# Patient Record
Sex: MALE | Race: WHITE | NOT HISPANIC OR LATINO | Employment: OTHER | URBAN - METROPOLITAN AREA
[De-identification: names, ages, dates, MRNs, and addresses within clinical notes are randomized per-mention and may not be internally consistent; named-entity substitution may affect disease eponyms.]

---

## 2020-05-24 ENCOUNTER — HOSPITAL ENCOUNTER (INPATIENT)
Facility: HOSPITAL | Age: 70
LOS: 4 days | DRG: 493 | End: 2020-05-28
Attending: INTERNAL MEDICINE | Admitting: INTERNAL MEDICINE
Payer: COMMERCIAL

## 2020-05-24 ENCOUNTER — APPOINTMENT (EMERGENCY)
Dept: RADIOLOGY | Facility: HOSPITAL | Age: 70
DRG: 493 | End: 2020-05-24
Payer: COMMERCIAL

## 2020-05-24 ENCOUNTER — APPOINTMENT (EMERGENCY)
Dept: CT IMAGING | Facility: HOSPITAL | Age: 70
DRG: 493 | End: 2020-05-24
Payer: COMMERCIAL

## 2020-05-24 DIAGNOSIS — S82.842A BIMALLEOLAR ANKLE FRACTURE, LEFT, CLOSED, INITIAL ENCOUNTER: Primary | ICD-10-CM

## 2020-05-24 PROBLEM — I87.2 VENOUS INSUFFICIENCY: Status: ACTIVE | Noted: 2017-03-03

## 2020-05-24 PROBLEM — H26.9 CATARACT: Status: ACTIVE | Noted: 2020-05-24

## 2020-05-24 PROBLEM — K21.9 ESOPHAGEAL REFLUX: Status: ACTIVE | Noted: 2020-05-24

## 2020-05-24 LAB
ABO GROUP BLD: NORMAL
ABO GROUP BLD: NORMAL
ANION GAP SERPL CALCULATED.3IONS-SCNC: 10 MMOL/L (ref 4–13)
APTT PPP: 29 SECONDS (ref 23–37)
BASOPHILS # BLD AUTO: 0 THOUSANDS/ΜL (ref 0–0.1)
BASOPHILS NFR BLD AUTO: 0 % (ref 0–2)
BILIRUB UR QL STRIP: NEGATIVE
BLD GP AB SCN SERPL QL: NEGATIVE
BUN SERPL-MCNC: 20 MG/DL (ref 7–25)
CALCIUM SERPL-MCNC: 8.9 MG/DL (ref 8.6–10.5)
CHLORIDE SERPL-SCNC: 107 MMOL/L (ref 98–107)
CLARITY UR: ABNORMAL
CO2 SERPL-SCNC: 21 MMOL/L (ref 21–31)
COLOR UR: YELLOW
CREAT SERPL-MCNC: 1.11 MG/DL (ref 0.7–1.3)
EOSINOPHIL # BLD AUTO: 0.2 THOUSAND/ΜL (ref 0–0.61)
EOSINOPHIL NFR BLD AUTO: 1 % (ref 0–5)
ERYTHROCYTE [DISTWIDTH] IN BLOOD BY AUTOMATED COUNT: 15.3 % (ref 11.5–14.5)
GFR SERPL CREATININE-BSD FRML MDRD: 66 ML/MIN/1.73SQ M
GLUCOSE SERPL-MCNC: 97 MG/DL (ref 65–99)
GLUCOSE UR STRIP-MCNC: NEGATIVE MG/DL
HCT VFR BLD AUTO: 44.4 % (ref 42–47)
HGB BLD-MCNC: 14.9 G/DL (ref 14–18)
HGB UR QL STRIP.AUTO: NEGATIVE
INR PPP: 1.59 (ref 0.84–1.19)
KETONES UR STRIP-MCNC: ABNORMAL MG/DL
LEUKOCYTE ESTERASE UR QL STRIP: NEGATIVE
LYMPHOCYTES # BLD AUTO: 1.6 THOUSANDS/ΜL (ref 0.6–4.47)
LYMPHOCYTES NFR BLD AUTO: 12 % (ref 21–51)
MCH RBC QN AUTO: 33.7 PG (ref 26–34)
MCHC RBC AUTO-ENTMCNC: 33.7 G/DL (ref 31–37)
MCV RBC AUTO: 100 FL (ref 81–99)
MONOCYTES # BLD AUTO: 1 THOUSAND/ΜL (ref 0.17–1.22)
MONOCYTES NFR BLD AUTO: 7 % (ref 2–12)
NEUTROPHILS # BLD AUTO: 10.8 THOUSANDS/ΜL (ref 1.4–6.5)
NEUTS SEG NFR BLD AUTO: 80 % (ref 42–75)
NITRITE UR QL STRIP: NEGATIVE
PH UR STRIP.AUTO: 6 [PH]
PLATELET # BLD AUTO: 445 THOUSANDS/UL (ref 149–390)
PMV BLD AUTO: 7.9 FL (ref 8.6–11.7)
POTASSIUM SERPL-SCNC: 4 MMOL/L (ref 3.5–5.5)
PROT UR STRIP-MCNC: NEGATIVE MG/DL
PROTHROMBIN TIME: 18.4 SECONDS (ref 11.6–14.5)
RBC # BLD AUTO: 4.43 MILLION/UL (ref 4.3–5.9)
RH BLD: POSITIVE
RH BLD: POSITIVE
SARS-COV-2 RNA RESP QL NAA+PROBE: NEGATIVE
SODIUM SERPL-SCNC: 138 MMOL/L (ref 134–143)
SP GR UR STRIP.AUTO: >=1.03 (ref 1–1.03)
SPECIMEN EXPIRATION DATE: NORMAL
TROPONIN I SERPL-MCNC: <0.03 NG/ML
UROBILINOGEN UR QL STRIP.AUTO: 0.2 E.U./DL
WBC # BLD AUTO: 13.6 THOUSAND/UL (ref 4.8–10.8)

## 2020-05-24 PROCEDURE — 99285 EMERGENCY DEPT VISIT HI MDM: CPT | Performed by: PHYSICIAN ASSISTANT

## 2020-05-24 PROCEDURE — 73600 X-RAY EXAM OF ANKLE: CPT

## 2020-05-24 PROCEDURE — 80048 BASIC METABOLIC PNL TOTAL CA: CPT | Performed by: PHYSICIAN ASSISTANT

## 2020-05-24 PROCEDURE — 85730 THROMBOPLASTIN TIME PARTIAL: CPT | Performed by: PHYSICIAN ASSISTANT

## 2020-05-24 PROCEDURE — 81003 URINALYSIS AUTO W/O SCOPE: CPT | Performed by: PHYSICIAN ASSISTANT

## 2020-05-24 PROCEDURE — 85025 COMPLETE CBC W/AUTO DIFF WBC: CPT | Performed by: PHYSICIAN ASSISTANT

## 2020-05-24 PROCEDURE — 36415 COLL VENOUS BLD VENIPUNCTURE: CPT | Performed by: PHYSICIAN ASSISTANT

## 2020-05-24 PROCEDURE — 27840 TREAT ANKLE DISLOCATION: CPT | Performed by: PHYSICIAN ASSISTANT

## 2020-05-24 PROCEDURE — 96375 TX/PRO/DX INJ NEW DRUG ADDON: CPT

## 2020-05-24 PROCEDURE — 86900 BLOOD TYPING SEROLOGIC ABO: CPT | Performed by: PHYSICIAN ASSISTANT

## 2020-05-24 PROCEDURE — 99285 EMERGENCY DEPT VISIT HI MDM: CPT

## 2020-05-24 PROCEDURE — 99223 1ST HOSP IP/OBS HIGH 75: CPT | Performed by: PHYSICIAN ASSISTANT

## 2020-05-24 PROCEDURE — 93005 ELECTROCARDIOGRAM TRACING: CPT

## 2020-05-24 PROCEDURE — 73590 X-RAY EXAM OF LOWER LEG: CPT

## 2020-05-24 PROCEDURE — 71045 X-RAY EXAM CHEST 1 VIEW: CPT

## 2020-05-24 PROCEDURE — 86901 BLOOD TYPING SEROLOGIC RH(D): CPT | Performed by: PHYSICIAN ASSISTANT

## 2020-05-24 PROCEDURE — 84484 ASSAY OF TROPONIN QUANT: CPT | Performed by: PHYSICIAN ASSISTANT

## 2020-05-24 PROCEDURE — 86850 RBC ANTIBODY SCREEN: CPT | Performed by: PHYSICIAN ASSISTANT

## 2020-05-24 PROCEDURE — 72125 CT NECK SPINE W/O DYE: CPT

## 2020-05-24 PROCEDURE — 2W3MX1Z IMMOBILIZATION OF LEFT LOWER EXTREMITY USING SPLINT: ICD-10-PCS | Performed by: EMERGENCY MEDICINE

## 2020-05-24 PROCEDURE — 73620 X-RAY EXAM OF FOOT: CPT

## 2020-05-24 PROCEDURE — 70450 CT HEAD/BRAIN W/O DYE: CPT

## 2020-05-24 PROCEDURE — 29505 APPLICATION LONG LEG SPLINT: CPT | Performed by: PHYSICIAN ASSISTANT

## 2020-05-24 PROCEDURE — 85610 PROTHROMBIN TIME: CPT | Performed by: PHYSICIAN ASSISTANT

## 2020-05-24 PROCEDURE — 73610 X-RAY EXAM OF ANKLE: CPT

## 2020-05-24 PROCEDURE — 99152 MOD SED SAME PHYS/QHP 5/>YRS: CPT | Performed by: PHYSICIAN ASSISTANT

## 2020-05-24 PROCEDURE — 96374 THER/PROPH/DIAG INJ IV PUSH: CPT

## 2020-05-24 PROCEDURE — 87635 SARS-COV-2 COVID-19 AMP PRB: CPT | Performed by: PHYSICIAN ASSISTANT

## 2020-05-24 RX ORDER — ACETAMINOPHEN 325 MG/1
650 TABLET ORAL EVERY 6 HOURS PRN
Status: DISCONTINUED | OUTPATIENT
Start: 2020-05-24 | End: 2020-05-26

## 2020-05-24 RX ORDER — DILTIAZEM HYDROCHLORIDE 300 MG/1
300 CAPSULE, COATED, EXTENDED RELEASE ORAL DAILY
Status: DISCONTINUED | OUTPATIENT
Start: 2020-05-25 | End: 2020-05-25

## 2020-05-24 RX ORDER — ETOMIDATE 2 MG/ML
20 INJECTION INTRAVENOUS ONCE
Status: COMPLETED | OUTPATIENT
Start: 2020-05-24 | End: 2020-05-24

## 2020-05-24 RX ORDER — HYDROCODONE BITARTRATE AND ACETAMINOPHEN 5; 325 MG/1; MG/1
1 TABLET ORAL EVERY 6 HOURS PRN
Status: DISCONTINUED | OUTPATIENT
Start: 2020-05-24 | End: 2020-05-25

## 2020-05-24 RX ORDER — ONDANSETRON 2 MG/ML
4 INJECTION INTRAMUSCULAR; INTRAVENOUS EVERY 6 HOURS PRN
Status: DISCONTINUED | OUTPATIENT
Start: 2020-05-24 | End: 2020-05-27

## 2020-05-24 RX ORDER — SPIRONOLACTONE 25 MG/1
25 TABLET ORAL DAILY
Status: DISCONTINUED | OUTPATIENT
Start: 2020-05-25 | End: 2020-05-25

## 2020-05-24 RX ORDER — SODIUM CHLORIDE 9 MG/ML
75 INJECTION, SOLUTION INTRAVENOUS CONTINUOUS
Status: DISCONTINUED | OUTPATIENT
Start: 2020-05-25 | End: 2020-05-25

## 2020-05-24 RX ORDER — DILTIAZEM HYDROCHLORIDE 300 MG/1
300 CAPSULE, EXTENDED RELEASE ORAL DAILY
COMMUNITY

## 2020-05-24 RX ORDER — FENTANYL CITRATE 50 UG/ML
1 INJECTION, SOLUTION INTRAMUSCULAR; INTRAVENOUS ONCE
Status: COMPLETED | OUTPATIENT
Start: 2020-05-24 | End: 2020-05-24

## 2020-05-24 RX ORDER — FENTANYL CITRATE 50 UG/ML
25 INJECTION, SOLUTION INTRAMUSCULAR; INTRAVENOUS ONCE
Status: COMPLETED | OUTPATIENT
Start: 2020-05-24 | End: 2020-05-24

## 2020-05-24 RX ORDER — HYDROMORPHONE HCL 110MG/55ML
0.5 PATIENT CONTROLLED ANALGESIA SYRINGE INTRAVENOUS ONCE
Status: COMPLETED | OUTPATIENT
Start: 2020-05-24 | End: 2020-05-24

## 2020-05-24 RX ADMIN — FENTANYL CITRATE 25 MCG: 50 INJECTION INTRAMUSCULAR; INTRAVENOUS at 20:20

## 2020-05-24 RX ADMIN — HYDROMORPHONE HYDROCHLORIDE 0.5 MG: 2 INJECTION, SOLUTION INTRAMUSCULAR; INTRAVENOUS; SUBCUTANEOUS at 20:04

## 2020-05-24 RX ADMIN — HYDROCODONE BITARTRATE AND ACETAMINOPHEN 1 TABLET: 5; 325 TABLET ORAL at 22:51

## 2020-05-24 RX ADMIN — ETOMIDATE 10 MG: 40 INJECTION, SOLUTION INTRAVENOUS at 20:10

## 2020-05-24 RX ADMIN — SODIUM CHLORIDE 75 ML/HR: 0.9 INJECTION, SOLUTION INTRAVENOUS at 23:49

## 2020-05-25 ENCOUNTER — ANESTHESIA EVENT (INPATIENT)
Dept: PERIOP | Facility: HOSPITAL | Age: 70
DRG: 493 | End: 2020-05-25
Payer: COMMERCIAL

## 2020-05-25 PROBLEM — Z01.818 PREOP EXAM FOR INTERNAL MEDICINE: Status: ACTIVE | Noted: 2020-05-25

## 2020-05-25 LAB
ANION GAP SERPL CALCULATED.3IONS-SCNC: 7 MMOL/L (ref 4–13)
ATRIAL RATE: 250 BPM
BUN SERPL-MCNC: 15 MG/DL (ref 7–25)
CALCIUM SERPL-MCNC: 8.2 MG/DL (ref 8.6–10.5)
CHLORIDE SERPL-SCNC: 109 MMOL/L (ref 98–107)
CO2 SERPL-SCNC: 23 MMOL/L (ref 21–31)
CREAT SERPL-MCNC: 0.75 MG/DL (ref 0.7–1.3)
ERYTHROCYTE [DISTWIDTH] IN BLOOD BY AUTOMATED COUNT: 14.9 % (ref 11.5–14.5)
GFR SERPL CREATININE-BSD FRML MDRD: 92 ML/MIN/1.73SQ M
GLUCOSE SERPL-MCNC: 86 MG/DL (ref 65–99)
HCT VFR BLD AUTO: 43.7 % (ref 42–47)
HGB BLD-MCNC: 15 G/DL (ref 14–18)
INR PPP: 1.46 (ref 0.84–1.19)
MCH RBC QN AUTO: 34.1 PG (ref 26–34)
MCHC RBC AUTO-ENTMCNC: 34.2 G/DL (ref 31–37)
MCV RBC AUTO: 100 FL (ref 81–99)
PLATELET # BLD AUTO: 451 THOUSANDS/UL (ref 149–390)
PMV BLD AUTO: 8.1 FL (ref 8.6–11.7)
POTASSIUM SERPL-SCNC: 3.4 MMOL/L (ref 3.5–5.5)
PROTHROMBIN TIME: 17.1 SECONDS (ref 11.6–14.5)
QRS AXIS: -35 DEGREES
QRSD INTERVAL: 94 MS
QT INTERVAL: 378 MS
QTC INTERVAL: 401 MS
RBC # BLD AUTO: 4.38 MILLION/UL (ref 4.3–5.9)
SODIUM SERPL-SCNC: 139 MMOL/L (ref 134–143)
T WAVE AXIS: -21 DEGREES
VENTRICULAR RATE: 68 BPM
WBC # BLD AUTO: 13.3 THOUSAND/UL (ref 4.8–10.8)

## 2020-05-25 PROCEDURE — 80048 BASIC METABOLIC PNL TOTAL CA: CPT | Performed by: PHYSICIAN ASSISTANT

## 2020-05-25 PROCEDURE — 85610 PROTHROMBIN TIME: CPT | Performed by: PHYSICIAN ASSISTANT

## 2020-05-25 PROCEDURE — 85027 COMPLETE CBC AUTOMATED: CPT | Performed by: PHYSICIAN ASSISTANT

## 2020-05-25 PROCEDURE — 93010 ELECTROCARDIOGRAM REPORT: CPT | Performed by: INTERNAL MEDICINE

## 2020-05-25 PROCEDURE — 99232 SBSQ HOSP IP/OBS MODERATE 35: CPT | Performed by: HOSPITALIST

## 2020-05-25 PROCEDURE — 99223 1ST HOSP IP/OBS HIGH 75: CPT | Performed by: ORTHOPAEDIC SURGERY

## 2020-05-25 RX ORDER — SODIUM CHLORIDE 9 MG/ML
75 INJECTION, SOLUTION INTRAVENOUS CONTINUOUS
Status: DISCONTINUED | OUTPATIENT
Start: 2020-05-26 | End: 2020-05-27

## 2020-05-25 RX ORDER — POTASSIUM CHLORIDE 20 MEQ/1
40 TABLET, EXTENDED RELEASE ORAL 2 TIMES DAILY
Status: COMPLETED | OUTPATIENT
Start: 2020-05-25 | End: 2020-05-26

## 2020-05-25 RX ORDER — HYDROCODONE BITARTRATE AND ACETAMINOPHEN 5; 325 MG/1; MG/1
1 TABLET ORAL EVERY 6 HOURS PRN
Status: DISCONTINUED | OUTPATIENT
Start: 2020-05-25 | End: 2020-05-26

## 2020-05-25 RX ORDER — DILTIAZEM HYDROCHLORIDE 120 MG/1
120 CAPSULE, COATED, EXTENDED RELEASE ORAL DAILY
Status: DISCONTINUED | OUTPATIENT
Start: 2020-05-25 | End: 2020-05-28 | Stop reason: HOSPADM

## 2020-05-25 RX ORDER — IBUPROFEN 600 MG/1
600 TABLET ORAL EVERY 6 HOURS PRN
Status: DISCONTINUED | OUTPATIENT
Start: 2020-05-25 | End: 2020-05-25

## 2020-05-25 RX ORDER — SPIRONOLACTONE 25 MG/1
25 TABLET ORAL DAILY
Status: DISCONTINUED | OUTPATIENT
Start: 2020-05-27 | End: 2020-05-28 | Stop reason: HOSPADM

## 2020-05-25 RX ORDER — HYDRALAZINE HYDROCHLORIDE 20 MG/ML
20 INJECTION INTRAMUSCULAR; INTRAVENOUS EVERY 6 HOURS PRN
Status: DISCONTINUED | OUTPATIENT
Start: 2020-05-25 | End: 2020-05-28 | Stop reason: HOSPADM

## 2020-05-25 RX ORDER — DILTIAZEM HYDROCHLORIDE 180 MG/1
180 CAPSULE, COATED, EXTENDED RELEASE ORAL DAILY
Status: DISCONTINUED | OUTPATIENT
Start: 2020-05-25 | End: 2020-05-28 | Stop reason: HOSPADM

## 2020-05-25 RX ADMIN — SPIRONOLACTONE 25 MG: 25 TABLET ORAL at 09:47

## 2020-05-25 RX ADMIN — DILTIAZEM HYDROCHLORIDE 120 MG: 120 CAPSULE, COATED, EXTENDED RELEASE ORAL at 09:48

## 2020-05-25 RX ADMIN — IBUPROFEN 600 MG: 600 TABLET, FILM COATED ORAL at 01:23

## 2020-05-25 RX ADMIN — POTASSIUM CHLORIDE 40 MEQ: 1500 TABLET, EXTENDED RELEASE ORAL at 18:07

## 2020-05-25 RX ADMIN — POTASSIUM CHLORIDE 40 MEQ: 1500 TABLET, EXTENDED RELEASE ORAL at 14:26

## 2020-05-25 RX ADMIN — HYDROCODONE BITARTRATE AND ACETAMINOPHEN 1 TABLET: 5; 325 TABLET ORAL at 20:37

## 2020-05-25 RX ADMIN — DILTIAZEM HYDROCHLORIDE 180 MG: 180 CAPSULE, COATED, EXTENDED RELEASE ORAL at 09:47

## 2020-05-26 ENCOUNTER — ANESTHESIA (INPATIENT)
Dept: PERIOP | Facility: HOSPITAL | Age: 70
DRG: 493 | End: 2020-05-26
Payer: COMMERCIAL

## 2020-05-26 ENCOUNTER — APPOINTMENT (INPATIENT)
Dept: RADIOLOGY | Facility: HOSPITAL | Age: 70
DRG: 493 | End: 2020-05-26
Payer: COMMERCIAL

## 2020-05-26 LAB
ANION GAP SERPL CALCULATED.3IONS-SCNC: 6 MMOL/L (ref 4–13)
BASOPHILS # BLD AUTO: 0 THOUSANDS/ΜL (ref 0–0.1)
BASOPHILS NFR BLD AUTO: 0 % (ref 0–2)
BUN SERPL-MCNC: 11 MG/DL (ref 7–25)
CALCIUM SERPL-MCNC: 8.3 MG/DL (ref 8.6–10.5)
CHLORIDE SERPL-SCNC: 107 MMOL/L (ref 98–107)
CO2 SERPL-SCNC: 24 MMOL/L (ref 21–31)
CREAT SERPL-MCNC: 0.91 MG/DL (ref 0.7–1.3)
EOSINOPHIL # BLD AUTO: 0.3 THOUSAND/ΜL (ref 0–0.61)
EOSINOPHIL NFR BLD AUTO: 2 % (ref 0–5)
ERYTHROCYTE [DISTWIDTH] IN BLOOD BY AUTOMATED COUNT: 15.1 % (ref 11.5–14.5)
GFR SERPL CREATININE-BSD FRML MDRD: 84 ML/MIN/1.73SQ M
GLUCOSE SERPL-MCNC: 103 MG/DL (ref 65–99)
HCT VFR BLD AUTO: 46.3 % (ref 42–47)
HGB BLD-MCNC: 15.8 G/DL (ref 14–18)
LYMPHOCYTES # BLD AUTO: 1.6 THOUSANDS/ΜL (ref 0.6–4.47)
LYMPHOCYTES NFR BLD AUTO: 13 % (ref 21–51)
MCH RBC QN AUTO: 34.1 PG (ref 26–34)
MCHC RBC AUTO-ENTMCNC: 34.1 G/DL (ref 31–37)
MCV RBC AUTO: 100 FL (ref 81–99)
MONOCYTES # BLD AUTO: 1.3 THOUSAND/ΜL (ref 0.17–1.22)
MONOCYTES NFR BLD AUTO: 10 % (ref 2–12)
NEUTROPHILS # BLD AUTO: 9.7 THOUSANDS/ΜL (ref 1.4–6.5)
NEUTS SEG NFR BLD AUTO: 75 % (ref 42–75)
PLATELET # BLD AUTO: 444 THOUSANDS/UL (ref 149–390)
PMV BLD AUTO: 7.9 FL (ref 8.6–11.7)
POTASSIUM SERPL-SCNC: 4.4 MMOL/L (ref 3.5–5.5)
PROCALCITONIN SERPL-MCNC: <0.05 NG/ML
RBC # BLD AUTO: 4.63 MILLION/UL (ref 4.3–5.9)
SODIUM SERPL-SCNC: 137 MMOL/L (ref 134–143)
WBC # BLD AUTO: 12.9 THOUSAND/UL (ref 4.8–10.8)

## 2020-05-26 PROCEDURE — C1713 ANCHOR/SCREW BN/BN,TIS/BN: HCPCS | Performed by: ORTHOPAEDIC SURGERY

## 2020-05-26 PROCEDURE — 27829 TREAT LOWER LEG JOINT: CPT | Performed by: ORTHOPAEDIC SURGERY

## 2020-05-26 PROCEDURE — 85025 COMPLETE CBC W/AUTO DIFF WBC: CPT | Performed by: HOSPITALIST

## 2020-05-26 PROCEDURE — 84145 PROCALCITONIN (PCT): CPT | Performed by: HOSPITALIST

## 2020-05-26 PROCEDURE — C1769 GUIDE WIRE: HCPCS | Performed by: ORTHOPAEDIC SURGERY

## 2020-05-26 PROCEDURE — 80048 BASIC METABOLIC PNL TOTAL CA: CPT | Performed by: HOSPITALIST

## 2020-05-26 PROCEDURE — 99232 SBSQ HOSP IP/OBS MODERATE 35: CPT | Performed by: HOSPITALIST

## 2020-05-26 PROCEDURE — 27814 TREATMENT OF ANKLE FRACTURE: CPT | Performed by: ORTHOPAEDIC SURGERY

## 2020-05-26 PROCEDURE — C9290 INJ, BUPIVACAINE LIPOSOME: HCPCS | Performed by: ANESTHESIOLOGY

## 2020-05-26 PROCEDURE — 73610 X-RAY EXAM OF ANKLE: CPT | Performed by: ORTHOPAEDIC SURGERY

## 2020-05-26 PROCEDURE — 3E0T3BZ INTRODUCTION OF ANESTHETIC AGENT INTO PERIPHERAL NERVES AND PLEXI, PERCUTANEOUS APPROACH: ICD-10-PCS | Performed by: ANESTHESIOLOGY

## 2020-05-26 PROCEDURE — 0QSK04Z REPOSITION LEFT FIBULA WITH INTERNAL FIXATION DEVICE, OPEN APPROACH: ICD-10-PCS | Performed by: ORTHOPAEDIC SURGERY

## 2020-05-26 PROCEDURE — 73610 X-RAY EXAM OF ANKLE: CPT

## 2020-05-26 PROCEDURE — 0QSH04Z REPOSITION LEFT TIBIA WITH INTERNAL FIXATION DEVICE, OPEN APPROACH: ICD-10-PCS | Performed by: ORTHOPAEDIC SURGERY

## 2020-05-26 DEVICE — 4.0MM CANNULATED SCREW-SHORT THREAD 54MM: Type: IMPLANTABLE DEVICE | Site: ANKLE | Status: FUNCTIONAL

## 2020-05-26 DEVICE — WASHER 7.0MM: Type: IMPLANTABLE DEVICE | Site: ANKLE | Status: FUNCTIONAL

## 2020-05-26 DEVICE — 3.5MM CORTEX SCREW SELF-TAPPING 16MM: Type: IMPLANTABLE DEVICE | Site: ANKLE | Status: FUNCTIONAL

## 2020-05-26 DEVICE — 3.5MM CORTEX SCREW SELF-TAPPING 14MM: Type: IMPLANTABLE DEVICE | Site: ANKLE | Status: FUNCTIONAL

## 2020-05-26 DEVICE — 2.7MM LOCKING SCREW SLF-TPNG WITH T8 STARDRIVE RECESS 14MM: Type: IMPLANTABLE DEVICE | Site: ANKLE | Status: FUNCTIONAL

## 2020-05-26 DEVICE — 2.7MM LOCKING SCREW SLF-TPNG WITH T8 STARDRIVE RECESS 18MM: Type: IMPLANTABLE DEVICE | Site: ANKLE | Status: FUNCTIONAL

## 2020-05-26 DEVICE — 2.7MM/3.5MM LCP LATERAL DISTAL FIBULA PLATE 4H/LEFT/86MM
Type: IMPLANTABLE DEVICE | Site: ANKLE | Status: FUNCTIONAL
Brand: LCP

## 2020-05-26 DEVICE — 2.7MM LOCKING SCREW SLF-TPNG WITH T8 STARDRIVE RECESS 16MM: Type: IMPLANTABLE DEVICE | Site: ANKLE | Status: FUNCTIONAL

## 2020-05-26 DEVICE — 3.5MM CORTEX SCREW SELF-TAPPING 50MM: Type: IMPLANTABLE DEVICE | Site: ANKLE | Status: FUNCTIONAL

## 2020-05-26 RX ORDER — METOCLOPRAMIDE HYDROCHLORIDE 5 MG/ML
10 INJECTION INTRAMUSCULAR; INTRAVENOUS ONCE AS NEEDED
Status: DISCONTINUED | OUTPATIENT
Start: 2020-05-26 | End: 2020-05-26 | Stop reason: HOSPADM

## 2020-05-26 RX ORDER — PANTOPRAZOLE SODIUM 40 MG/1
40 TABLET, DELAYED RELEASE ORAL DAILY
Status: DISCONTINUED | OUTPATIENT
Start: 2020-05-26 | End: 2020-05-28 | Stop reason: HOSPADM

## 2020-05-26 RX ORDER — OXYCODONE HYDROCHLORIDE 10 MG/1
10 TABLET ORAL EVERY 4 HOURS PRN
Status: DISCONTINUED | OUTPATIENT
Start: 2020-05-26 | End: 2020-05-28 | Stop reason: HOSPADM

## 2020-05-26 RX ORDER — CEFAZOLIN SODIUM 2 G/50ML
2000 SOLUTION INTRAVENOUS ONCE
Status: COMPLETED | OUTPATIENT
Start: 2020-05-26 | End: 2020-05-26

## 2020-05-26 RX ORDER — BUPIVACAINE HYDROCHLORIDE 5 MG/ML
INJECTION, SOLUTION PERINEURAL
Status: COMPLETED | OUTPATIENT
Start: 2020-05-26 | End: 2020-05-26

## 2020-05-26 RX ORDER — SODIUM CHLORIDE, SODIUM LACTATE, POTASSIUM CHLORIDE, CALCIUM CHLORIDE 600; 310; 30; 20 MG/100ML; MG/100ML; MG/100ML; MG/100ML
INJECTION, SOLUTION INTRAVENOUS CONTINUOUS PRN
Status: DISCONTINUED | OUTPATIENT
Start: 2020-05-26 | End: 2020-05-26 | Stop reason: SURG

## 2020-05-26 RX ORDER — FENTANYL CITRATE/PF 50 MCG/ML
50 SYRINGE (ML) INJECTION
Status: DISCONTINUED | OUTPATIENT
Start: 2020-05-26 | End: 2020-05-26 | Stop reason: HOSPADM

## 2020-05-26 RX ORDER — ACETAMINOPHEN 325 MG/1
650 TABLET ORAL EVERY 6 HOURS SCHEDULED
Status: DISCONTINUED | OUTPATIENT
Start: 2020-05-26 | End: 2020-05-28 | Stop reason: HOSPADM

## 2020-05-26 RX ORDER — OXYCODONE HYDROCHLORIDE 5 MG/1
5 TABLET ORAL EVERY 4 HOURS PRN
Status: DISCONTINUED | OUTPATIENT
Start: 2020-05-26 | End: 2020-05-28 | Stop reason: HOSPADM

## 2020-05-26 RX ORDER — BUPIVACAINE HYDROCHLORIDE 2.5 MG/ML
INJECTION, SOLUTION EPIDURAL; INFILTRATION; INTRACAUDAL
Status: COMPLETED | OUTPATIENT
Start: 2020-05-26 | End: 2020-05-26

## 2020-05-26 RX ORDER — MAGNESIUM HYDROXIDE 1200 MG/15ML
LIQUID ORAL AS NEEDED
Status: DISCONTINUED | OUTPATIENT
Start: 2020-05-26 | End: 2020-05-26 | Stop reason: HOSPADM

## 2020-05-26 RX ORDER — MIDAZOLAM HYDROCHLORIDE 2 MG/2ML
INJECTION, SOLUTION INTRAMUSCULAR; INTRAVENOUS
Status: COMPLETED | OUTPATIENT
Start: 2020-05-26 | End: 2020-05-26

## 2020-05-26 RX ORDER — SODIUM CHLORIDE, SODIUM LACTATE, POTASSIUM CHLORIDE, CALCIUM CHLORIDE 600; 310; 30; 20 MG/100ML; MG/100ML; MG/100ML; MG/100ML
100 INJECTION, SOLUTION INTRAVENOUS CONTINUOUS
Status: DISCONTINUED | OUTPATIENT
Start: 2020-05-26 | End: 2020-05-27

## 2020-05-26 RX ORDER — GINSENG 100 MG
CAPSULE ORAL AS NEEDED
Status: DISCONTINUED | OUTPATIENT
Start: 2020-05-26 | End: 2020-05-26 | Stop reason: HOSPADM

## 2020-05-26 RX ORDER — DEXAMETHASONE SODIUM PHOSPHATE 10 MG/ML
INJECTION, SOLUTION INTRAMUSCULAR; INTRAVENOUS AS NEEDED
Status: DISCONTINUED | OUTPATIENT
Start: 2020-05-26 | End: 2020-05-26 | Stop reason: SURG

## 2020-05-26 RX ORDER — ONDANSETRON 2 MG/ML
INJECTION INTRAMUSCULAR; INTRAVENOUS AS NEEDED
Status: DISCONTINUED | OUTPATIENT
Start: 2020-05-26 | End: 2020-05-26 | Stop reason: SURG

## 2020-05-26 RX ORDER — CEFAZOLIN SODIUM 2 G/50ML
2000 SOLUTION INTRAVENOUS EVERY 8 HOURS
Status: COMPLETED | OUTPATIENT
Start: 2020-05-26 | End: 2020-05-27

## 2020-05-26 RX ORDER — FENTANYL CITRATE 50 UG/ML
INJECTION, SOLUTION INTRAMUSCULAR; INTRAVENOUS AS NEEDED
Status: DISCONTINUED | OUTPATIENT
Start: 2020-05-26 | End: 2020-05-26 | Stop reason: SURG

## 2020-05-26 RX ORDER — PROPOFOL 10 MG/ML
INJECTION, EMULSION INTRAVENOUS AS NEEDED
Status: DISCONTINUED | OUTPATIENT
Start: 2020-05-26 | End: 2020-05-26 | Stop reason: SURG

## 2020-05-26 RX ORDER — DOCUSATE SODIUM 100 MG/1
100 CAPSULE, LIQUID FILLED ORAL 2 TIMES DAILY
Status: DISCONTINUED | OUTPATIENT
Start: 2020-05-26 | End: 2020-05-28 | Stop reason: HOSPADM

## 2020-05-26 RX ADMIN — FENTANYL CITRATE 50 MCG: 50 INJECTION INTRAMUSCULAR; INTRAVENOUS at 12:42

## 2020-05-26 RX ADMIN — SODIUM CHLORIDE, SODIUM LACTATE, POTASSIUM CHLORIDE, AND CALCIUM CHLORIDE: .6; .31; .03; .02 INJECTION, SOLUTION INTRAVENOUS at 11:51

## 2020-05-26 RX ADMIN — BUPIVACAINE HYDROCHLORIDE 10 ML: 2.5 INJECTION, SOLUTION EPIDURAL; INFILTRATION; INTRACAUDAL; PERINEURAL at 11:54

## 2020-05-26 RX ADMIN — SODIUM CHLORIDE 75 ML/HR: 0.9 INJECTION, SOLUTION INTRAVENOUS at 02:34

## 2020-05-26 RX ADMIN — DOCUSATE SODIUM 100 MG: 100 CAPSULE, LIQUID FILLED ORAL at 17:10

## 2020-05-26 RX ADMIN — DILTIAZEM HYDROCHLORIDE 180 MG: 180 CAPSULE, COATED, EXTENDED RELEASE ORAL at 08:47

## 2020-05-26 RX ADMIN — POTASSIUM CHLORIDE 40 MEQ: 1500 TABLET, EXTENDED RELEASE ORAL at 17:10

## 2020-05-26 RX ADMIN — DILTIAZEM HYDROCHLORIDE 120 MG: 120 CAPSULE, COATED, EXTENDED RELEASE ORAL at 08:48

## 2020-05-26 RX ADMIN — BUPIVACAINE 10 ML: 13.3 INJECTION, SUSPENSION, LIPOSOMAL INFILTRATION at 11:58

## 2020-05-26 RX ADMIN — ACETAMINOPHEN 650 MG: 325 TABLET ORAL at 23:08

## 2020-05-26 RX ADMIN — ACETAMINOPHEN 650 MG: 325 TABLET ORAL at 17:10

## 2020-05-26 RX ADMIN — MIDAZOLAM HYDROCHLORIDE 2 MG: 1 INJECTION, SOLUTION INTRAMUSCULAR; INTRAVENOUS at 11:54

## 2020-05-26 RX ADMIN — MORPHINE SULFATE 2 MG: 2 INJECTION, SOLUTION INTRAMUSCULAR; INTRAVENOUS at 05:55

## 2020-05-26 RX ADMIN — PROPOFOL 150 MG: 10 INJECTION, EMULSION INTRAVENOUS at 12:29

## 2020-05-26 RX ADMIN — CEFAZOLIN SODIUM 2000 MG: 2 SOLUTION INTRAVENOUS at 22:26

## 2020-05-26 RX ADMIN — POTASSIUM CHLORIDE 40 MEQ: 1500 TABLET, EXTENDED RELEASE ORAL at 08:47

## 2020-05-26 RX ADMIN — CEFAZOLIN SODIUM 2000 MG: 2 SOLUTION INTRAVENOUS at 12:26

## 2020-05-26 RX ADMIN — ONDANSETRON 4 MG: 2 INJECTION INTRAMUSCULAR; INTRAVENOUS at 12:48

## 2020-05-26 RX ADMIN — DEXAMETHASONE SODIUM PHOSPHATE 10 MG: 10 INJECTION, SOLUTION INTRAMUSCULAR; INTRAVENOUS at 12:48

## 2020-05-26 RX ADMIN — FENTANYL CITRATE 25 MCG: 50 INJECTION INTRAMUSCULAR; INTRAVENOUS at 13:40

## 2020-05-26 RX ADMIN — PROPOFOL 50 MG: 10 INJECTION, EMULSION INTRAVENOUS at 12:39

## 2020-05-26 RX ADMIN — BUPIVACAINE HYDROCHLORIDE 30 ML: 5 INJECTION, SOLUTION PERINEURAL at 12:08

## 2020-05-26 RX ADMIN — MORPHINE SULFATE 2 MG: 2 INJECTION, SOLUTION INTRAMUSCULAR; INTRAVENOUS at 00:06

## 2020-05-26 RX ADMIN — FENTANYL CITRATE 25 MCG: 50 INJECTION INTRAMUSCULAR; INTRAVENOUS at 13:53

## 2020-05-27 LAB
ANION GAP SERPL CALCULATED.3IONS-SCNC: 7 MMOL/L (ref 4–13)
BUN SERPL-MCNC: 14 MG/DL (ref 7–25)
CALCIUM SERPL-MCNC: 8.7 MG/DL (ref 8.6–10.5)
CHLORIDE SERPL-SCNC: 106 MMOL/L (ref 98–107)
CO2 SERPL-SCNC: 22 MMOL/L (ref 21–31)
CREAT SERPL-MCNC: 0.78 MG/DL (ref 0.7–1.3)
ERYTHROCYTE [DISTWIDTH] IN BLOOD BY AUTOMATED COUNT: 14.7 % (ref 11.5–14.5)
GFR SERPL CREATININE-BSD FRML MDRD: 91 ML/MIN/1.73SQ M
GLUCOSE SERPL-MCNC: 161 MG/DL (ref 65–99)
HCT VFR BLD AUTO: 45.8 % (ref 42–47)
HGB BLD-MCNC: 15.6 G/DL (ref 14–18)
LYMPHOCYTES # BLD AUTO: 0.74 THOUSAND/UL (ref 0.6–4.47)
LYMPHOCYTES # BLD AUTO: 3 % (ref 20–51)
MCH RBC QN AUTO: 33.8 PG (ref 26–34)
MCHC RBC AUTO-ENTMCNC: 33.9 G/DL (ref 31–37)
MCV RBC AUTO: 100 FL (ref 81–99)
MONOCYTES # BLD AUTO: 1.24 THOUSAND/UL (ref 0–1.22)
MONOCYTES NFR BLD AUTO: 5 % (ref 4–12)
NEUTS BAND NFR BLD MANUAL: 7 % (ref 0–8)
NEUTS SEG # BLD: 22.82 THOUSAND/UL (ref 1.81–6.82)
NEUTS SEG NFR BLD AUTO: 85 % (ref 42–75)
PLATELET # BLD AUTO: 470 THOUSANDS/UL (ref 149–390)
PLATELET BLD QL SMEAR: ABNORMAL
PMV BLD AUTO: 8.4 FL (ref 8.6–11.7)
POTASSIUM SERPL-SCNC: 4.7 MMOL/L (ref 3.5–5.5)
RBC # BLD AUTO: 4.6 MILLION/UL (ref 4.3–5.9)
RBC MORPH BLD: NORMAL
SODIUM SERPL-SCNC: 135 MMOL/L (ref 134–143)
TOTAL CELLS COUNTED SPEC: 100
WBC # BLD AUTO: 24.8 THOUSAND/UL (ref 4.8–10.8)

## 2020-05-27 PROCEDURE — 97116 GAIT TRAINING THERAPY: CPT

## 2020-05-27 PROCEDURE — 97163 PT EVAL HIGH COMPLEX 45 MIN: CPT

## 2020-05-27 PROCEDURE — 97530 THERAPEUTIC ACTIVITIES: CPT

## 2020-05-27 PROCEDURE — 97167 OT EVAL HIGH COMPLEX 60 MIN: CPT

## 2020-05-27 PROCEDURE — 85007 BL SMEAR W/DIFF WBC COUNT: CPT | Performed by: PHYSICIAN ASSISTANT

## 2020-05-27 PROCEDURE — 97110 THERAPEUTIC EXERCISES: CPT

## 2020-05-27 PROCEDURE — 85027 COMPLETE CBC AUTOMATED: CPT | Performed by: PHYSICIAN ASSISTANT

## 2020-05-27 PROCEDURE — 80048 BASIC METABOLIC PNL TOTAL CA: CPT | Performed by: PHYSICIAN ASSISTANT

## 2020-05-27 PROCEDURE — 99232 SBSQ HOSP IP/OBS MODERATE 35: CPT | Performed by: HOSPITALIST

## 2020-05-27 RX ADMIN — DILTIAZEM HYDROCHLORIDE 120 MG: 120 CAPSULE, COATED, EXTENDED RELEASE ORAL at 08:38

## 2020-05-27 RX ADMIN — DILTIAZEM HYDROCHLORIDE 180 MG: 180 CAPSULE, COATED, EXTENDED RELEASE ORAL at 08:39

## 2020-05-27 RX ADMIN — PANTOPRAZOLE SODIUM 40 MG: 40 TABLET, DELAYED RELEASE ORAL at 08:38

## 2020-05-27 RX ADMIN — CEFAZOLIN SODIUM 2000 MG: 2 SOLUTION INTRAVENOUS at 13:49

## 2020-05-27 RX ADMIN — APIXABAN 5 MG: 5 TABLET, FILM COATED ORAL at 05:48

## 2020-05-27 RX ADMIN — DOCUSATE SODIUM 100 MG: 100 CAPSULE, LIQUID FILLED ORAL at 08:38

## 2020-05-27 RX ADMIN — DOCUSATE SODIUM 100 MG: 100 CAPSULE, LIQUID FILLED ORAL at 17:30

## 2020-05-27 RX ADMIN — ACETAMINOPHEN 650 MG: 325 TABLET ORAL at 23:12

## 2020-05-27 RX ADMIN — CEFAZOLIN SODIUM 2000 MG: 2 SOLUTION INTRAVENOUS at 05:47

## 2020-05-27 RX ADMIN — ACETAMINOPHEN 650 MG: 325 TABLET ORAL at 05:48

## 2020-05-27 RX ADMIN — SPIRONOLACTONE 25 MG: 25 TABLET ORAL at 08:39

## 2020-05-27 RX ADMIN — APIXABAN 5 MG: 5 TABLET, FILM COATED ORAL at 17:30

## 2020-05-28 ENCOUNTER — HOSPITAL ENCOUNTER (INPATIENT)
Facility: HOSPITAL | Age: 70
LOS: 9 days | Discharge: HOME | DRG: 560 | End: 2020-06-06
Attending: PHYSICAL MEDICINE & REHABILITATION | Admitting: PHYSICAL MEDICINE & REHABILITATION
Payer: COMMERCIAL

## 2020-05-28 VITALS
WEIGHT: 200.84 LBS | HEART RATE: 71 BPM | SYSTOLIC BLOOD PRESSURE: 119 MMHG | RESPIRATION RATE: 18 BRPM | HEIGHT: 71 IN | TEMPERATURE: 96.6 F | BODY MASS INDEX: 28.12 KG/M2 | OXYGEN SATURATION: 98 % | DIASTOLIC BLOOD PRESSURE: 79 MMHG

## 2020-05-28 DIAGNOSIS — I48.19 PERSISTENT ATRIAL FIBRILLATION (HCC): ICD-10-CM

## 2020-05-28 DIAGNOSIS — S82.842A BIMALLEOLAR ANKLE FRACTURE, LEFT, CLOSED, INITIAL ENCOUNTER: ICD-10-CM

## 2020-05-28 DIAGNOSIS — I10 ESSENTIAL HYPERTENSION: Primary | ICD-10-CM

## 2020-05-28 DIAGNOSIS — R52 PAIN: ICD-10-CM

## 2020-05-28 PROBLEM — D72.829 LEUKOCYTOSIS: Status: ACTIVE | Noted: 2020-05-28

## 2020-05-28 PROBLEM — D75.839 THROMBOCYTOSIS: Status: ACTIVE | Noted: 2020-05-28

## 2020-05-28 LAB
ERYTHROCYTE [DISTWIDTH] IN BLOOD BY AUTOMATED COUNT: 15 % (ref 11.5–14.5)
HCT VFR BLD AUTO: 43.8 % (ref 42–47)
HGB BLD-MCNC: 14.8 G/DL (ref 14–18)
MCH RBC QN AUTO: 33.7 PG (ref 26–34)
MCHC RBC AUTO-ENTMCNC: 33.9 G/DL (ref 31–37)
MCV RBC AUTO: 100 FL (ref 81–99)
PLATELET # BLD AUTO: 507 THOUSANDS/UL (ref 149–390)
PMV BLD AUTO: 8.5 FL (ref 8.6–11.7)
RBC # BLD AUTO: 4.4 MILLION/UL (ref 4.3–5.9)
WBC # BLD AUTO: 23.3 THOUSAND/UL (ref 4.8–10.8)

## 2020-05-28 PROCEDURE — 85027 COMPLETE CBC AUTOMATED: CPT | Performed by: HOSPITALIST

## 2020-05-28 PROCEDURE — 99239 HOSP IP/OBS DSCHRG MGMT >30: CPT | Performed by: HOSPITALIST

## 2020-05-28 PROCEDURE — 97110 THERAPEUTIC EXERCISES: CPT

## 2020-05-28 PROCEDURE — 97530 THERAPEUTIC ACTIVITIES: CPT

## 2020-05-28 PROCEDURE — 99024 POSTOP FOLLOW-UP VISIT: CPT | Performed by: PHYSICIAN ASSISTANT

## 2020-05-28 PROCEDURE — 97116 GAIT TRAINING THERAPY: CPT

## 2020-05-28 PROCEDURE — 99223 1ST HOSP IP/OBS HIGH 75: CPT | Performed by: PHYSICAL MEDICINE & REHABILITATION

## 2020-05-28 PROCEDURE — NC001 PR NO CHARGE

## 2020-05-28 RX ORDER — ACETAMINOPHEN 325 MG/1
650 TABLET ORAL EVERY 6 HOURS SCHEDULED
Status: CANCELLED | OUTPATIENT
Start: 2020-05-28

## 2020-05-28 RX ORDER — ACETAMINOPHEN 325 MG/1
650 TABLET ORAL EVERY 6 HOURS PRN
Status: DISCONTINUED | OUTPATIENT
Start: 2020-05-28 | End: 2020-06-06 | Stop reason: HOSPADM

## 2020-05-28 RX ORDER — SPIRONOLACTONE 25 MG/1
25 TABLET ORAL DAILY
Status: DISCONTINUED | OUTPATIENT
Start: 2020-05-29 | End: 2020-06-06 | Stop reason: HOSPADM

## 2020-05-28 RX ORDER — POLYETHYLENE GLYCOL 3350 17 G/17G
17 POWDER, FOR SOLUTION ORAL DAILY PRN
Status: DISCONTINUED | OUTPATIENT
Start: 2020-05-28 | End: 2020-06-06 | Stop reason: HOSPADM

## 2020-05-28 RX ORDER — OXYCODONE HYDROCHLORIDE 5 MG/1
5 TABLET ORAL EVERY 4 HOURS PRN
Status: DISCONTINUED | OUTPATIENT
Start: 2020-05-28 | End: 2020-06-06 | Stop reason: HOSPADM

## 2020-05-28 RX ORDER — OXYCODONE HYDROCHLORIDE 5 MG/1
5 TABLET ORAL EVERY 4 HOURS PRN
Status: CANCELLED | OUTPATIENT
Start: 2020-05-28

## 2020-05-28 RX ORDER — DILTIAZEM HYDROCHLORIDE 120 MG/1
120 CAPSULE, COATED, EXTENDED RELEASE ORAL DAILY
Status: DISCONTINUED | OUTPATIENT
Start: 2020-05-29 | End: 2020-06-06 | Stop reason: HOSPADM

## 2020-05-28 RX ORDER — DOCUSATE SODIUM 100 MG/1
100 CAPSULE, LIQUID FILLED ORAL 2 TIMES DAILY
Status: CANCELLED | OUTPATIENT
Start: 2020-05-28

## 2020-05-28 RX ORDER — SPIRONOLACTONE 25 MG/1
25 TABLET ORAL DAILY
Status: CANCELLED | OUTPATIENT
Start: 2020-05-29

## 2020-05-28 RX ORDER — OXYCODONE HYDROCHLORIDE 10 MG/1
10 TABLET ORAL EVERY 4 HOURS PRN
Status: CANCELLED | OUTPATIENT
Start: 2020-05-28

## 2020-05-28 RX ORDER — HYDRALAZINE HYDROCHLORIDE 20 MG/ML
20 INJECTION INTRAMUSCULAR; INTRAVENOUS EVERY 6 HOURS PRN
Status: CANCELLED | OUTPATIENT
Start: 2020-05-28

## 2020-05-28 RX ORDER — OXYCODONE HYDROCHLORIDE 10 MG/1
10 TABLET ORAL EVERY 4 HOURS PRN
Status: DISCONTINUED | OUTPATIENT
Start: 2020-05-28 | End: 2020-06-06 | Stop reason: HOSPADM

## 2020-05-28 RX ORDER — DILTIAZEM HYDROCHLORIDE 180 MG/1
180 CAPSULE, COATED, EXTENDED RELEASE ORAL DAILY
Status: DISCONTINUED | OUTPATIENT
Start: 2020-05-29 | End: 2020-06-06 | Stop reason: HOSPADM

## 2020-05-28 RX ORDER — DILTIAZEM HYDROCHLORIDE 180 MG/1
180 CAPSULE, COATED, EXTENDED RELEASE ORAL DAILY
Status: CANCELLED | OUTPATIENT
Start: 2020-05-29

## 2020-05-28 RX ORDER — DILTIAZEM HYDROCHLORIDE 120 MG/1
120 CAPSULE, COATED, EXTENDED RELEASE ORAL DAILY
Status: CANCELLED | OUTPATIENT
Start: 2020-05-29

## 2020-05-28 RX ORDER — DOCUSATE SODIUM 100 MG/1
100 CAPSULE, LIQUID FILLED ORAL 2 TIMES DAILY
Status: DISCONTINUED | OUTPATIENT
Start: 2020-05-29 | End: 2020-06-05

## 2020-05-28 RX ORDER — PANTOPRAZOLE SODIUM 40 MG/1
40 TABLET, DELAYED RELEASE ORAL DAILY
Status: CANCELLED | OUTPATIENT
Start: 2020-05-29

## 2020-05-28 RX ADMIN — PANTOPRAZOLE SODIUM 40 MG: 40 TABLET, DELAYED RELEASE ORAL at 10:53

## 2020-05-28 RX ADMIN — DOCUSATE SODIUM 100 MG: 100 CAPSULE, LIQUID FILLED ORAL at 10:53

## 2020-05-28 RX ADMIN — ACETAMINOPHEN 650 MG: 325 TABLET ORAL at 17:24

## 2020-05-28 RX ADMIN — DILTIAZEM HYDROCHLORIDE 180 MG: 180 CAPSULE, COATED, EXTENDED RELEASE ORAL at 10:55

## 2020-05-28 RX ADMIN — APIXABAN 5 MG: 5 TABLET, FILM COATED ORAL at 17:26

## 2020-05-28 RX ADMIN — SPIRONOLACTONE 25 MG: 25 TABLET ORAL at 10:53

## 2020-05-28 RX ADMIN — ACETAMINOPHEN 650 MG: 325 TABLET ORAL at 23:47

## 2020-05-28 RX ADMIN — APIXABAN 5 MG: 5 TABLET, FILM COATED ORAL at 10:54

## 2020-05-28 RX ADMIN — DOCUSATE SODIUM 100 MG: 100 CAPSULE, LIQUID FILLED ORAL at 17:26

## 2020-05-28 RX ADMIN — ACETAMINOPHEN 650 MG: 325 TABLET ORAL at 05:20

## 2020-05-28 RX ADMIN — DILTIAZEM HYDROCHLORIDE 120 MG: 120 CAPSULE, COATED, EXTENDED RELEASE ORAL at 10:54

## 2020-05-29 LAB
BASOPHILS # BLD AUTO: 0 THOUSANDS/ΜL (ref 0–0.1)
BASOPHILS NFR BLD AUTO: 0 % (ref 0–2)
EOSINOPHIL # BLD AUTO: 0.3 THOUSAND/ΜL (ref 0–0.61)
EOSINOPHIL NFR BLD AUTO: 2 % (ref 0–5)
ERYTHROCYTE [DISTWIDTH] IN BLOOD BY AUTOMATED COUNT: 15.1 % (ref 11.5–14.5)
HCT VFR BLD AUTO: 42.8 % (ref 42–47)
HGB BLD-MCNC: 14.7 G/DL (ref 14–18)
LYMPHOCYTES # BLD AUTO: 2.8 THOUSANDS/ΜL (ref 0.6–4.47)
LYMPHOCYTES NFR BLD AUTO: 18 % (ref 21–51)
MCH RBC QN AUTO: 34.1 PG (ref 26–34)
MCHC RBC AUTO-ENTMCNC: 34.5 G/DL (ref 31–37)
MCV RBC AUTO: 99 FL (ref 81–99)
MONOCYTES # BLD AUTO: 1.8 THOUSAND/ΜL (ref 0.17–1.22)
MONOCYTES NFR BLD AUTO: 11 % (ref 2–12)
NEUTROPHILS # BLD AUTO: 11.1 THOUSANDS/ΜL (ref 1.4–6.5)
NEUTS SEG NFR BLD AUTO: 69 % (ref 42–75)
PLATELET # BLD AUTO: 466 THOUSANDS/UL (ref 149–390)
PMV BLD AUTO: 7.9 FL (ref 8.6–11.7)
RBC # BLD AUTO: 4.33 MILLION/UL (ref 4.3–5.9)
WBC # BLD AUTO: 16 THOUSAND/UL (ref 4.8–10.8)

## 2020-05-29 PROCEDURE — 97530 THERAPEUTIC ACTIVITIES: CPT

## 2020-05-29 PROCEDURE — 97535 SELF CARE MNGMENT TRAINING: CPT

## 2020-05-29 PROCEDURE — 97110 THERAPEUTIC EXERCISES: CPT

## 2020-05-29 PROCEDURE — 97116 GAIT TRAINING THERAPY: CPT

## 2020-05-29 PROCEDURE — 99232 SBSQ HOSP IP/OBS MODERATE 35: CPT | Performed by: PHYSICAL MEDICINE & REHABILITATION

## 2020-05-29 PROCEDURE — 99253 IP/OBS CNSLTJ NEW/EST LOW 45: CPT | Performed by: HOSPITALIST

## 2020-05-29 PROCEDURE — 97162 PT EVAL MOD COMPLEX 30 MIN: CPT

## 2020-05-29 PROCEDURE — 97542 WHEELCHAIR MNGMENT TRAINING: CPT

## 2020-05-29 PROCEDURE — 85025 COMPLETE CBC W/AUTO DIFF WBC: CPT | Performed by: NURSE PRACTITIONER

## 2020-05-29 PROCEDURE — 97167 OT EVAL HIGH COMPLEX 60 MIN: CPT

## 2020-05-29 PROCEDURE — 97537 COMMUNITY/WORK REINTEGRATION: CPT

## 2020-05-29 RX ADMIN — SPIRONOLACTONE 25 MG: 25 TABLET ORAL at 08:31

## 2020-05-29 RX ADMIN — DOCUSATE SODIUM 100 MG: 100 CAPSULE, LIQUID FILLED ORAL at 17:24

## 2020-05-29 RX ADMIN — DOCUSATE SODIUM 100 MG: 100 CAPSULE, LIQUID FILLED ORAL at 08:31

## 2020-05-29 RX ADMIN — ACETAMINOPHEN 650 MG: 325 TABLET ORAL at 12:28

## 2020-05-29 RX ADMIN — APIXABAN 5 MG: 5 TABLET, FILM COATED ORAL at 17:24

## 2020-05-29 RX ADMIN — DILTIAZEM HYDROCHLORIDE 180 MG: 180 CAPSULE, COATED, EXTENDED RELEASE ORAL at 08:34

## 2020-05-29 RX ADMIN — DILTIAZEM HYDROCHLORIDE 120 MG: 120 CAPSULE, COATED, EXTENDED RELEASE ORAL at 08:35

## 2020-05-29 RX ADMIN — APIXABAN 5 MG: 5 TABLET, FILM COATED ORAL at 08:31

## 2020-05-29 RX ADMIN — ACETAMINOPHEN 650 MG: 325 TABLET ORAL at 23:16

## 2020-05-30 PROCEDURE — 97537 COMMUNITY/WORK REINTEGRATION: CPT

## 2020-05-30 PROCEDURE — 97110 THERAPEUTIC EXERCISES: CPT

## 2020-05-30 PROCEDURE — 97116 GAIT TRAINING THERAPY: CPT

## 2020-05-30 PROCEDURE — 97530 THERAPEUTIC ACTIVITIES: CPT

## 2020-05-30 RX ADMIN — SPIRONOLACTONE 25 MG: 25 TABLET ORAL at 09:21

## 2020-05-30 RX ADMIN — DILTIAZEM HYDROCHLORIDE 120 MG: 120 CAPSULE, COATED, EXTENDED RELEASE ORAL at 09:21

## 2020-05-30 RX ADMIN — ACETAMINOPHEN 650 MG: 325 TABLET ORAL at 23:44

## 2020-05-30 RX ADMIN — DILTIAZEM HYDROCHLORIDE 180 MG: 180 CAPSULE, COATED, EXTENDED RELEASE ORAL at 09:20

## 2020-05-30 RX ADMIN — DOCUSATE SODIUM 100 MG: 100 CAPSULE, LIQUID FILLED ORAL at 09:20

## 2020-05-30 RX ADMIN — APIXABAN 5 MG: 5 TABLET, FILM COATED ORAL at 09:20

## 2020-05-30 RX ADMIN — ACETAMINOPHEN 650 MG: 325 TABLET ORAL at 09:29

## 2020-05-30 RX ADMIN — APIXABAN 5 MG: 5 TABLET, FILM COATED ORAL at 17:45

## 2020-05-30 RX ADMIN — DOCUSATE SODIUM 100 MG: 100 CAPSULE, LIQUID FILLED ORAL at 17:45

## 2020-05-30 RX ADMIN — ACETAMINOPHEN 650 MG: 325 TABLET ORAL at 16:10

## 2020-05-31 RX ADMIN — ACETAMINOPHEN 650 MG: 325 TABLET ORAL at 07:24

## 2020-05-31 RX ADMIN — DILTIAZEM HYDROCHLORIDE 180 MG: 180 CAPSULE, COATED, EXTENDED RELEASE ORAL at 09:53

## 2020-05-31 RX ADMIN — ACETAMINOPHEN 650 MG: 325 TABLET ORAL at 23:25

## 2020-05-31 RX ADMIN — ACETAMINOPHEN 650 MG: 325 TABLET ORAL at 17:25

## 2020-05-31 RX ADMIN — APIXABAN 5 MG: 5 TABLET, FILM COATED ORAL at 09:42

## 2020-05-31 RX ADMIN — SPIRONOLACTONE 25 MG: 25 TABLET ORAL at 09:53

## 2020-05-31 RX ADMIN — APIXABAN 5 MG: 5 TABLET, FILM COATED ORAL at 17:22

## 2020-05-31 RX ADMIN — DOCUSATE SODIUM 100 MG: 100 CAPSULE, LIQUID FILLED ORAL at 09:42

## 2020-05-31 RX ADMIN — DILTIAZEM HYDROCHLORIDE 300 MG: 120 CAPSULE, COATED, EXTENDED RELEASE ORAL at 09:51

## 2020-06-01 LAB
BASOPHILS # BLD AUTO: 0.1 THOUSANDS/ΜL (ref 0–0.1)
BASOPHILS NFR BLD AUTO: 0 % (ref 0–2)
EOSINOPHIL # BLD AUTO: 0.5 THOUSAND/ΜL (ref 0–0.61)
EOSINOPHIL NFR BLD AUTO: 4 % (ref 0–5)
ERYTHROCYTE [DISTWIDTH] IN BLOOD BY AUTOMATED COUNT: 14.7 % (ref 11.5–14.5)
HCT VFR BLD AUTO: 42.9 % (ref 42–47)
HGB BLD-MCNC: 15 G/DL (ref 14–18)
LYMPHOCYTES # BLD AUTO: 2.3 THOUSANDS/ΜL (ref 0.6–4.47)
LYMPHOCYTES NFR BLD AUTO: 18 % (ref 21–51)
MCH RBC QN AUTO: 34.4 PG (ref 26–34)
MCHC RBC AUTO-ENTMCNC: 34.9 G/DL (ref 31–37)
MCV RBC AUTO: 99 FL (ref 81–99)
MONOCYTES # BLD AUTO: 1.4 THOUSAND/ΜL (ref 0.17–1.22)
MONOCYTES NFR BLD AUTO: 11 % (ref 2–12)
NEUTROPHILS # BLD AUTO: 8.6 THOUSANDS/ΜL (ref 1.4–6.5)
NEUTS SEG NFR BLD AUTO: 67 % (ref 42–75)
PLATELET # BLD AUTO: 511 THOUSANDS/UL (ref 149–390)
PMV BLD AUTO: 7.8 FL (ref 8.6–11.7)
RBC # BLD AUTO: 4.35 MILLION/UL (ref 4.3–5.9)
WBC # BLD AUTO: 13 THOUSAND/UL (ref 4.8–10.8)

## 2020-06-01 PROCEDURE — 97116 GAIT TRAINING THERAPY: CPT

## 2020-06-01 PROCEDURE — 99232 SBSQ HOSP IP/OBS MODERATE 35: CPT | Performed by: PHYSICAL MEDICINE & REHABILITATION

## 2020-06-01 PROCEDURE — 97537 COMMUNITY/WORK REINTEGRATION: CPT

## 2020-06-01 PROCEDURE — 97110 THERAPEUTIC EXERCISES: CPT

## 2020-06-01 PROCEDURE — 97530 THERAPEUTIC ACTIVITIES: CPT

## 2020-06-01 PROCEDURE — 85025 COMPLETE CBC W/AUTO DIFF WBC: CPT | Performed by: PHYSICAL MEDICINE & REHABILITATION

## 2020-06-01 PROCEDURE — 97535 SELF CARE MNGMENT TRAINING: CPT

## 2020-06-01 RX ADMIN — APIXABAN 5 MG: 5 TABLET, FILM COATED ORAL at 08:28

## 2020-06-01 RX ADMIN — ACETAMINOPHEN 650 MG: 325 TABLET ORAL at 22:44

## 2020-06-01 RX ADMIN — SPIRONOLACTONE 25 MG: 25 TABLET ORAL at 08:27

## 2020-06-01 RX ADMIN — DILTIAZEM HYDROCHLORIDE 120 MG: 120 CAPSULE, COATED, EXTENDED RELEASE ORAL at 08:28

## 2020-06-01 RX ADMIN — APIXABAN 5 MG: 5 TABLET, FILM COATED ORAL at 17:41

## 2020-06-01 RX ADMIN — ACETAMINOPHEN 650 MG: 325 TABLET ORAL at 15:32

## 2020-06-01 RX ADMIN — ACETAMINOPHEN 650 MG: 325 TABLET ORAL at 06:27

## 2020-06-01 RX ADMIN — DILTIAZEM HYDROCHLORIDE 180 MG: 180 CAPSULE, COATED, EXTENDED RELEASE ORAL at 08:29

## 2020-06-02 PROCEDURE — 97110 THERAPEUTIC EXERCISES: CPT

## 2020-06-02 PROCEDURE — 97530 THERAPEUTIC ACTIVITIES: CPT

## 2020-06-02 PROCEDURE — 97116 GAIT TRAINING THERAPY: CPT

## 2020-06-02 PROCEDURE — 99233 SBSQ HOSP IP/OBS HIGH 50: CPT | Performed by: PHYSICAL MEDICINE & REHABILITATION

## 2020-06-02 PROCEDURE — 97537 COMMUNITY/WORK REINTEGRATION: CPT

## 2020-06-02 RX ADMIN — ACETAMINOPHEN 650 MG: 325 TABLET ORAL at 15:28

## 2020-06-02 RX ADMIN — DILTIAZEM HYDROCHLORIDE 120 MG: 120 CAPSULE, COATED, EXTENDED RELEASE ORAL at 08:22

## 2020-06-02 RX ADMIN — SPIRONOLACTONE 25 MG: 25 TABLET ORAL at 08:22

## 2020-06-02 RX ADMIN — ACETAMINOPHEN 650 MG: 325 TABLET ORAL at 22:32

## 2020-06-02 RX ADMIN — APIXABAN 5 MG: 5 TABLET, FILM COATED ORAL at 17:20

## 2020-06-02 RX ADMIN — APIXABAN 5 MG: 5 TABLET, FILM COATED ORAL at 08:22

## 2020-06-02 RX ADMIN — ACETAMINOPHEN 650 MG: 325 TABLET ORAL at 08:22

## 2020-06-02 RX ADMIN — DILTIAZEM HYDROCHLORIDE 180 MG: 180 CAPSULE, COATED, EXTENDED RELEASE ORAL at 08:21

## 2020-06-03 ENCOUNTER — APPOINTMENT (INPATIENT)
Dept: RADIOLOGY | Facility: HOSPITAL | Age: 70
DRG: 560 | End: 2020-06-03
Payer: COMMERCIAL

## 2020-06-03 PROCEDURE — 97535 SELF CARE MNGMENT TRAINING: CPT

## 2020-06-03 PROCEDURE — 97110 THERAPEUTIC EXERCISES: CPT

## 2020-06-03 PROCEDURE — 97530 THERAPEUTIC ACTIVITIES: CPT

## 2020-06-03 PROCEDURE — 73610 X-RAY EXAM OF ANKLE: CPT

## 2020-06-03 PROCEDURE — 97116 GAIT TRAINING THERAPY: CPT

## 2020-06-03 PROCEDURE — 97537 COMMUNITY/WORK REINTEGRATION: CPT

## 2020-06-03 PROCEDURE — 99232 SBSQ HOSP IP/OBS MODERATE 35: CPT | Performed by: PHYSICAL MEDICINE & REHABILITATION

## 2020-06-03 PROCEDURE — 99024 POSTOP FOLLOW-UP VISIT: CPT | Performed by: ORTHOPAEDIC SURGERY

## 2020-06-03 RX ADMIN — APIXABAN 5 MG: 5 TABLET, FILM COATED ORAL at 08:18

## 2020-06-03 RX ADMIN — ACETAMINOPHEN 650 MG: 325 TABLET ORAL at 06:48

## 2020-06-03 RX ADMIN — APIXABAN 5 MG: 5 TABLET, FILM COATED ORAL at 17:02

## 2020-06-03 RX ADMIN — ACETAMINOPHEN 650 MG: 325 TABLET ORAL at 23:04

## 2020-06-03 RX ADMIN — SPIRONOLACTONE 25 MG: 25 TABLET ORAL at 08:18

## 2020-06-03 RX ADMIN — DILTIAZEM HYDROCHLORIDE 180 MG: 180 CAPSULE, COATED, EXTENDED RELEASE ORAL at 08:18

## 2020-06-03 RX ADMIN — DILTIAZEM HYDROCHLORIDE 120 MG: 120 CAPSULE, COATED, EXTENDED RELEASE ORAL at 08:18

## 2020-06-03 RX ADMIN — ACETAMINOPHEN 650 MG: 325 TABLET ORAL at 14:35

## 2020-06-04 PROCEDURE — 97530 THERAPEUTIC ACTIVITIES: CPT

## 2020-06-04 PROCEDURE — 97110 THERAPEUTIC EXERCISES: CPT

## 2020-06-04 PROCEDURE — 97537 COMMUNITY/WORK REINTEGRATION: CPT

## 2020-06-04 PROCEDURE — 97116 GAIT TRAINING THERAPY: CPT

## 2020-06-04 PROCEDURE — 99232 SBSQ HOSP IP/OBS MODERATE 35: CPT | Performed by: PHYSICAL MEDICINE & REHABILITATION

## 2020-06-04 RX ADMIN — ACETAMINOPHEN 650 MG: 325 TABLET ORAL at 08:23

## 2020-06-04 RX ADMIN — ACETAMINOPHEN 650 MG: 325 TABLET ORAL at 22:42

## 2020-06-04 RX ADMIN — DILTIAZEM HYDROCHLORIDE 180 MG: 180 CAPSULE, COATED, EXTENDED RELEASE ORAL at 08:20

## 2020-06-04 RX ADMIN — DILTIAZEM HYDROCHLORIDE 120 MG: 120 CAPSULE, COATED, EXTENDED RELEASE ORAL at 08:21

## 2020-06-04 RX ADMIN — APIXABAN 5 MG: 5 TABLET, FILM COATED ORAL at 17:13

## 2020-06-04 RX ADMIN — ACETAMINOPHEN 650 MG: 325 TABLET ORAL at 16:26

## 2020-06-04 RX ADMIN — SPIRONOLACTONE 25 MG: 25 TABLET ORAL at 08:22

## 2020-06-04 RX ADMIN — APIXABAN 5 MG: 5 TABLET, FILM COATED ORAL at 08:22

## 2020-06-05 VITALS
RESPIRATION RATE: 18 BRPM | WEIGHT: 197.9 LBS | BODY MASS INDEX: 27.71 KG/M2 | TEMPERATURE: 97.4 F | DIASTOLIC BLOOD PRESSURE: 88 MMHG | HEIGHT: 71 IN | SYSTOLIC BLOOD PRESSURE: 130 MMHG | HEART RATE: 72 BPM | OXYGEN SATURATION: 97 %

## 2020-06-05 PROCEDURE — 97110 THERAPEUTIC EXERCISES: CPT

## 2020-06-05 PROCEDURE — 97116 GAIT TRAINING THERAPY: CPT

## 2020-06-05 PROCEDURE — 97537 COMMUNITY/WORK REINTEGRATION: CPT

## 2020-06-05 PROCEDURE — 97530 THERAPEUTIC ACTIVITIES: CPT

## 2020-06-05 PROCEDURE — 97535 SELF CARE MNGMENT TRAINING: CPT

## 2020-06-05 RX ORDER — SPIRONOLACTONE 25 MG/1
25 TABLET ORAL DAILY
COMMUNITY
Start: 2020-03-25 | End: 2021-03-25

## 2020-06-05 RX ORDER — ACETAMINOPHEN 325 MG/1
650 TABLET ORAL EVERY 6 HOURS PRN
Qty: 100 TABLET | Refills: 0
Start: 2020-06-05

## 2020-06-05 RX ORDER — DOCUSATE SODIUM 100 MG/1
100 CAPSULE, LIQUID FILLED ORAL 2 TIMES DAILY PRN
Status: DISCONTINUED | OUTPATIENT
Start: 2020-06-05 | End: 2020-06-06 | Stop reason: HOSPADM

## 2020-06-05 RX ADMIN — APIXABAN 5 MG: 5 TABLET, FILM COATED ORAL at 18:29

## 2020-06-05 RX ADMIN — SPIRONOLACTONE 25 MG: 25 TABLET ORAL at 08:43

## 2020-06-05 RX ADMIN — ACETAMINOPHEN 650 MG: 325 TABLET ORAL at 15:40

## 2020-06-05 RX ADMIN — DILTIAZEM HYDROCHLORIDE 180 MG: 180 CAPSULE, COATED, EXTENDED RELEASE ORAL at 08:48

## 2020-06-05 RX ADMIN — ACETAMINOPHEN 650 MG: 325 TABLET ORAL at 08:45

## 2020-06-05 RX ADMIN — DILTIAZEM HYDROCHLORIDE 300 MG: 120 CAPSULE, COATED, EXTENDED RELEASE ORAL at 08:46

## 2020-06-05 RX ADMIN — APIXABAN 5 MG: 5 TABLET, FILM COATED ORAL at 08:45

## 2020-06-05 RX ADMIN — ACETAMINOPHEN 650 MG: 325 TABLET ORAL at 23:02

## 2020-06-06 PROCEDURE — 99239 HOSP IP/OBS DSCHRG MGMT >30: CPT

## 2020-06-06 RX ADMIN — DILTIAZEM HYDROCHLORIDE 180 MG: 180 CAPSULE, COATED, EXTENDED RELEASE ORAL at 09:09

## 2020-06-06 RX ADMIN — DILTIAZEM HYDROCHLORIDE 300 MG: 120 CAPSULE, COATED, EXTENDED RELEASE ORAL at 09:07

## 2020-06-06 RX ADMIN — SPIRONOLACTONE 25 MG: 25 TABLET ORAL at 09:07

## 2020-06-06 RX ADMIN — APIXABAN 5 MG: 5 TABLET, FILM COATED ORAL at 09:08

## 2020-06-10 ENCOUNTER — OFFICE VISIT (OUTPATIENT)
Dept: OBGYN CLINIC | Facility: CLINIC | Age: 70
End: 2020-06-10

## 2020-06-10 VITALS
DIASTOLIC BLOOD PRESSURE: 74 MMHG | SYSTOLIC BLOOD PRESSURE: 122 MMHG | TEMPERATURE: 97 F | HEIGHT: 71 IN | BODY MASS INDEX: 27.6 KG/M2

## 2020-06-10 DIAGNOSIS — S82.842D CLOSED DISPLACED BIMALLEOLAR FRACTURE OF LEFT LOWER LEG WITH ROUTINE HEALING: Primary | ICD-10-CM

## 2020-06-10 PROCEDURE — 99024 POSTOP FOLLOW-UP VISIT: CPT | Performed by: ORTHOPAEDIC SURGERY

## 2020-06-24 ENCOUNTER — OFFICE VISIT (OUTPATIENT)
Dept: OBGYN CLINIC | Facility: CLINIC | Age: 70
End: 2020-06-24

## 2020-06-24 ENCOUNTER — APPOINTMENT (OUTPATIENT)
Dept: RADIOLOGY | Facility: CLINIC | Age: 70
End: 2020-06-24
Payer: COMMERCIAL

## 2020-06-24 VITALS — DIASTOLIC BLOOD PRESSURE: 68 MMHG | SYSTOLIC BLOOD PRESSURE: 110 MMHG | TEMPERATURE: 97.6 F

## 2020-06-24 DIAGNOSIS — S82.842D CLOSED DISPLACED BIMALLEOLAR FRACTURE OF LEFT LOWER LEG WITH ROUTINE HEALING: ICD-10-CM

## 2020-06-24 DIAGNOSIS — S82.842D CLOSED DISPLACED BIMALLEOLAR FRACTURE OF LEFT LOWER LEG WITH ROUTINE HEALING: Primary | ICD-10-CM

## 2020-06-24 PROCEDURE — 99024 POSTOP FOLLOW-UP VISIT: CPT | Performed by: ORTHOPAEDIC SURGERY

## 2020-06-24 PROCEDURE — 73610 X-RAY EXAM OF ANKLE: CPT

## 2020-07-07 ENCOUNTER — APPOINTMENT (OUTPATIENT)
Dept: RADIOLOGY | Facility: CLINIC | Age: 70
End: 2020-07-07
Payer: COMMERCIAL

## 2020-07-07 ENCOUNTER — OFFICE VISIT (OUTPATIENT)
Dept: OBGYN CLINIC | Facility: CLINIC | Age: 70
End: 2020-07-07

## 2020-07-07 VITALS — BODY MASS INDEX: 27.48 KG/M2 | WEIGHT: 197 LBS | TEMPERATURE: 96.9 F

## 2020-07-07 DIAGNOSIS — S82.842D CLOSED DISPLACED BIMALLEOLAR FRACTURE OF LEFT LOWER LEG WITH ROUTINE HEALING: ICD-10-CM

## 2020-07-07 DIAGNOSIS — S82.842D CLOSED DISPLACED BIMALLEOLAR FRACTURE OF LEFT LOWER LEG WITH ROUTINE HEALING: Primary | ICD-10-CM

## 2020-07-07 PROCEDURE — 73610 X-RAY EXAM OF ANKLE: CPT

## 2020-07-07 PROCEDURE — 99024 POSTOP FOLLOW-UP VISIT: CPT | Performed by: ORTHOPAEDIC SURGERY

## 2020-07-07 NOTE — PROGRESS NOTES
Assessment:     1  Closed displaced bimalleolar fracture of left lower leg with routine healing          Plan:     Problem List Items Addressed This Visit        Musculoskeletal and Integument    Closed displaced bimalleolar fracture of left lower leg with routine healing - Primary     - WBAT with boot on  - may sleep without boot  - daily showers with clean, dry dressings  - f/u 6 weeks with xrays of left ankle         Relevant Medications    Gauze Bandages (JUAN FLUFF ROLLS) MISC    Other Relevant Orders    XR ankle 3+ vw left           Patient ID: Jaylin Duncan is a 68 y o  male  Chief Complaint:  F/u l ankle fracture    Subjective:  17-year-old male who had a left ankle fracture on May 24th  He was initially treated by Dr Shen Jara and is new to me to be seen for routine follow-up  He had surgery on May 25th for a bimalleolar ankle fracture  He's wearing the CAM boot but not putting any weight on it  Trouble sleeping due to boot  Allergy:  No Known Allergies    Medications:  all current active meds have been reviewed    ROS:  Review of Systems   All other systems reviewed and are negative  Objective:  BP Readings from Last 1 Encounters:   06/24/20 110/68      Wt Readings from Last 1 Encounters:   07/07/20 89 4 kg (197 lb)        Exam:   Physical Exam  Left Ankle Exam     Comments:  Medial wound well healed  Lateral wound healed with no drainage- open superficially in proximal 2 cm              Radiographs:  I have personally reviewed pertinent films in PACS and my interpretation is maintained alignment of left ankle bimalleaolar fracture

## 2020-07-07 NOTE — ASSESSMENT & PLAN NOTE
- WBAT with boot on  - may sleep without boot  - daily showers with clean, dry dressings  - f/u 6 weeks with xrays of left ankle

## 2020-07-08 ENCOUNTER — TELEPHONE (OUTPATIENT)
Dept: OBGYN CLINIC | Facility: HOSPITAL | Age: 70
End: 2020-07-08

## 2020-07-08 ENCOUNTER — OFFICE VISIT (OUTPATIENT)
Dept: OBGYN CLINIC | Facility: CLINIC | Age: 70
End: 2020-07-08

## 2020-07-08 VITALS
WEIGHT: 197 LBS | BODY MASS INDEX: 27.48 KG/M2 | RESPIRATION RATE: 18 BRPM | OXYGEN SATURATION: 99 % | TEMPERATURE: 98.1 F | HEART RATE: 50 BPM

## 2020-07-08 DIAGNOSIS — S82.842D CLOSED DISPLACED BIMALLEOLAR FRACTURE OF LEFT LOWER LEG WITH ROUTINE HEALING: Primary | ICD-10-CM

## 2020-07-08 PROCEDURE — 99024 POSTOP FOLLOW-UP VISIT: CPT | Performed by: ORTHOPAEDIC SURGERY

## 2020-07-08 RX ORDER — CEPHALEXIN 500 MG/1
500 CAPSULE ORAL EVERY 12 HOURS SCHEDULED
Qty: 14 CAPSULE | Refills: 0 | Status: SHIPPED | OUTPATIENT
Start: 2020-07-08 | End: 2020-07-15

## 2020-07-08 NOTE — TELEPHONE ENCOUNTER
Please be advise I spoke to the pt  Wife who stated pt  Had some drainage coming from the incision on left ankle the size of a 1/2 dollar yesterday  Today pt  Incision is red approximately 4'' across and is red and draining   Wife insisted in seeing Dr Mario Nix or Sol Tinoco today to have this checked for an infection   I advised on Dr Darrell Garcia location and advised Dr Mario Nix was in surgery   Pt  Wife stated she didn't care where she had to go but wanted pt  To be seen   I scheduled pt  At 4:45 today with Dr Sol Tinooc   Wife is aware of date, time and place of appt

## 2020-07-08 NOTE — TELEPHONE ENCOUNTER
Patient is calling to report that the surgical site is weeping at incision and is red  He is asking to speak with a nurse  Call transferred to [facility]

## 2020-07-08 NOTE — PROGRESS NOTES
Patient Name:  Lei Blanco  MRN:  [id number]    Assessment     1  Closed displaced bimalleolar fracture of left lower leg with routine healing         Plan     1  I would recommend follow-up as already scheduled by Dr Rosalind Doss when he was seen yesterday  However, if he does not see some improvement over the next week, the office should be contacted I will be happy to see him next week  A light dressing was applied and may be changed daily  He may cleanse, but should not submerge the incision under water  The boot may be removed for periods of inactivity but should be worn when he is active  I did suggest the use of antibiotics for 1 week and a prescription was sent to his pharmacy  Return for As scheduled  Subjective   Lei Blanco returns for follow-up of his left bimalleolar ankle fracture  The patient and his wife contacted the office earlier today with concerns about the incision  He was seen by Dr Rosalind Doss yesterday  States that when he awoke this morning he noted some clear, yellow drainage from the proximal portion of the lateral incision  Dressings were applied  The contacted the office and he was instructed to be seen today  He notes that the incision looks much better this afternoon that it did earlier today  He denies any significant pain  He denies fever, chills or other systemic symptoms  Objective     Pulse (!) 50   Temp 98 1 °F (36 7 °C) (Tympanic)   Resp 18   Wt 89 4 kg (197 lb)   SpO2 99%   BMI 27 48 kg/m²     The exam demonstrates the wound to be benign in appearance  He does have some graciela-incisional redness both medially and laterally  There is a 1 5 cm open area at the proximal aspect of the lateral incision without drainage  There is no localized tenderness  There is no significant swelling at this time          Skyler Rodríguez

## 2020-07-09 ENCOUNTER — TELEPHONE (OUTPATIENT)
Dept: OBGYN CLINIC | Facility: CLINIC | Age: 70
End: 2020-07-09

## 2020-07-09 NOTE — TELEPHONE ENCOUNTER
Julia Priest   #: [phone]           Patient is calling in requesting a call back  He was seen yesterday with Dr Tate for L leg  Patient is calling in wanting to speak to Julia Priest in regards to this issue  He feels his leg is still has clear dressing coming out and is still warm to touch  He's afraid he has an infection  He would like to speak to Dr Hudson   Please advise, thank you

## 2020-07-10 NOTE — TELEPHONE ENCOUNTER
I spoke to the patient and his wife  They both admit that there is less drainage today than there had been when he was seen by me recently  He is taking his antibiotics and the redness has faded some  There primary concern was the sense that the walker cast boot was dirty and might be a risk for infection  I offered to write a prescription and have them  a boot at the office but did explain to than that this might not be covered by insurance  I also discussed the ability to find a walker cast boot using an Internet search and they will proceed in that direction  They are to contact the office if they do not feel they can find an adequate cast boot via Internet search

## 2020-07-16 ENCOUNTER — OFFICE VISIT (OUTPATIENT)
Dept: OBGYN CLINIC | Facility: CLINIC | Age: 70
End: 2020-07-16

## 2020-07-16 VITALS — HEART RATE: 56 BPM | WEIGHT: 197 LBS | TEMPERATURE: 98.1 F | BODY MASS INDEX: 27.48 KG/M2 | RESPIRATION RATE: 18 BRPM

## 2020-07-16 DIAGNOSIS — S82.842D CLOSED DISPLACED BIMALLEOLAR FRACTURE OF LEFT LOWER LEG WITH ROUTINE HEALING: Primary | ICD-10-CM

## 2020-07-16 PROCEDURE — 99024 POSTOP FOLLOW-UP VISIT: CPT | Performed by: ORTHOPAEDIC SURGERY

## 2020-07-16 NOTE — PROGRESS NOTES
Patient Name:  Kasey Muller  MRN:  [id number]    Assessment     1  Closed displaced bimalleolar fracture of left lower leg with routine healing         Plan     1  Would recommend follow-up as previously scheduled by Dr Darrell Webster, approximately 5 weeks from now  However, both Byron Yarbrough and his wife were instructed to contact me if any questions or concerns were to arise and they feel that re-evaluation is warranted on a more urgent basis and he could be seen by either myself or Dr Darrell Webster  Wet to dry dressing is to be performed once or twice daily and continued until the wound has healed fully  No follow-ups on file  Subjective   Kasey Muller returns for follow-up of his left ankle fracture  The patient is  7 week(s) post opt and returns for re-evaluation would a small area of proximal wound dehiscence  He was seen 1 week ago, placed on antibiotics as a precaution and instructed to follow up as previously recommended in several weeks with Dr Darrell Webster  Patient And his wife noted some increased redness earlier this week, perhaps over the weekend  After 2 or 3 days of antibiotics this seem to improve  The redness, however, was more like a rash with a diffuse faint redness throughout the leg  The incision itself did not appear overly red and they do not see any significant worsening of the appearance of the incision  Objective     Pulse 56   Temp 98 1 °F (36 7 °C)   Resp 18   Wt 89 4 kg (197 lb)   BMI 27 48 kg/m²     The exam demonstrates the boot in place upon arrival   This was removed  The Steri-Strips is in place  This was removed without difficulty  The wound is benign with no significant change from the evaluation of 1 week ago  A wet to dry dressing was applied          Eugenio Nic

## 2020-08-06 ENCOUNTER — APPOINTMENT (OUTPATIENT)
Dept: RADIOLOGY | Facility: HOSPITAL | Age: 70
DRG: 949 | End: 2020-08-06
Payer: COMMERCIAL

## 2020-08-06 ENCOUNTER — HOSPITAL ENCOUNTER (INPATIENT)
Facility: HOSPITAL | Age: 70
LOS: 2 days | Discharge: HOME | DRG: 949 | End: 2020-08-09
Attending: EMERGENCY MEDICINE | Admitting: HOSPITALIST
Payer: COMMERCIAL

## 2020-08-06 ENCOUNTER — APPOINTMENT (EMERGENCY)
Dept: RADIOLOGY | Facility: HOSPITAL | Age: 70
DRG: 949 | End: 2020-08-06
Payer: COMMERCIAL

## 2020-08-06 DIAGNOSIS — R00.0 TACHYCARDIA: ICD-10-CM

## 2020-08-06 DIAGNOSIS — R65.10 SIRS (SYSTEMIC INFLAMMATORY RESPONSE SYNDROME) (HCC): ICD-10-CM

## 2020-08-06 DIAGNOSIS — R53.1 GENERALIZED WEAKNESS: Primary | ICD-10-CM

## 2020-08-06 DIAGNOSIS — S82.842D CLOSED DISPLACED BIMALLEOLAR FRACTURE OF LEFT LOWER LEG WITH ROUTINE HEALING: ICD-10-CM

## 2020-08-06 DIAGNOSIS — E86.0 DEHYDRATION: ICD-10-CM

## 2020-08-06 LAB
ALBUMIN SERPL BCP-MCNC: 3.7 G/DL (ref 3.5–5.7)
ALP SERPL-CCNC: 76 U/L (ref 55–165)
ALT SERPL W P-5'-P-CCNC: 23 U/L (ref 7–52)
ANION GAP SERPL CALCULATED.3IONS-SCNC: 9 MMOL/L (ref 4–13)
AST SERPL W P-5'-P-CCNC: 30 U/L (ref 13–39)
BILIRUB SERPL-MCNC: 1.2 MG/DL (ref 0.2–1)
BILIRUB UR QL STRIP: NEGATIVE
BUN SERPL-MCNC: 19 MG/DL (ref 7–25)
BURR CELLS BLD QL SMEAR: PRESENT
CALCIUM SERPL-MCNC: 9.2 MG/DL (ref 8.6–10.5)
CHLORIDE SERPL-SCNC: 106 MMOL/L (ref 98–107)
CLARITY UR: CLEAR
CO2 SERPL-SCNC: 22 MMOL/L (ref 21–31)
COLOR UR: YELLOW
CREAT SERPL-MCNC: 0.95 MG/DL (ref 0.7–1.3)
CRP SERPL QL: 21.2 MG/L
D DIMER PPP FEU-MCNC: 0.39 UG/ML FEU
EOSINOPHIL # BLD AUTO: 0.23 THOUSAND/UL (ref 0–0.61)
EOSINOPHIL NFR BLD MANUAL: 1 % (ref 0–6)
ERYTHROCYTE [DISTWIDTH] IN BLOOD BY AUTOMATED COUNT: 14.3 % (ref 11.5–14.5)
ERYTHROCYTE [SEDIMENTATION RATE] IN BLOOD: 11 MM/HOUR (ref 0–19)
GFR SERPL CREATININE-BSD FRML MDRD: 80 ML/MIN/1.73SQ M
GLUCOSE SERPL-MCNC: 104 MG/DL (ref 65–99)
GLUCOSE UR STRIP-MCNC: NEGATIVE MG/DL
HCT VFR BLD AUTO: 43.9 % (ref 42–47)
HGB BLD-MCNC: 15 G/DL (ref 14–18)
HGB UR QL STRIP.AUTO: NEGATIVE
KETONES UR STRIP-MCNC: ABNORMAL MG/DL
LACTATE SERPL-SCNC: 1.2 MMOL/L (ref 0.5–2)
LEUKOCYTE ESTERASE UR QL STRIP: NEGATIVE
LYMPHOCYTES # BLD AUTO: 1.37 THOUSAND/UL (ref 0.6–4.47)
LYMPHOCYTES # BLD AUTO: 6 % (ref 20–51)
MCH RBC QN AUTO: 33.7 PG (ref 26–34)
MCHC RBC AUTO-ENTMCNC: 34.1 G/DL (ref 31–37)
MCV RBC AUTO: 99 FL (ref 81–99)
MONOCYTES # BLD AUTO: 1.15 THOUSAND/UL (ref 0–1.22)
MONOCYTES NFR BLD AUTO: 5 % (ref 4–12)
NEUTS SEG # BLD: 20.15 THOUSAND/UL (ref 1.81–6.82)
NEUTS SEG NFR BLD AUTO: 88 % (ref 42–75)
NITRITE UR QL STRIP: NEGATIVE
PH UR STRIP.AUTO: 7 [PH]
PLATELET # BLD AUTO: 563 THOUSANDS/UL (ref 149–390)
PLATELET BLD QL SMEAR: ABNORMAL
PMV BLD AUTO: 8.1 FL (ref 8.6–11.7)
POTASSIUM SERPL-SCNC: 4.3 MMOL/L (ref 3.5–5.5)
PROCALCITONIN SERPL-MCNC: <0.05 NG/ML
PROT SERPL-MCNC: 6.7 G/DL (ref 6.4–8.9)
PROT UR STRIP-MCNC: NEGATIVE MG/DL
RBC # BLD AUTO: 4.44 MILLION/UL (ref 4.3–5.9)
RBC MORPH BLD: PRESENT
SODIUM SERPL-SCNC: 137 MMOL/L (ref 134–143)
SP GR UR STRIP.AUTO: 1.02 (ref 1–1.03)
TOTAL CELLS COUNTED SPEC: 100
TROPONIN I SERPL-MCNC: <0.03 NG/ML
UROBILINOGEN UR QL STRIP.AUTO: 2 E.U./DL
WBC # BLD AUTO: 22.9 THOUSAND/UL (ref 4.8–10.8)

## 2020-08-06 PROCEDURE — 99285 EMERGENCY DEPT VISIT HI MDM: CPT | Performed by: EMERGENCY MEDICINE

## 2020-08-06 PROCEDURE — 99220 PR INITIAL OBSERVATION CARE/DAY 70 MINUTES: CPT | Performed by: PHYSICIAN ASSISTANT

## 2020-08-06 PROCEDURE — 36415 COLL VENOUS BLD VENIPUNCTURE: CPT | Performed by: EMERGENCY MEDICINE

## 2020-08-06 PROCEDURE — 81003 URINALYSIS AUTO W/O SCOPE: CPT | Performed by: EMERGENCY MEDICINE

## 2020-08-06 PROCEDURE — 87081 CULTURE SCREEN ONLY: CPT | Performed by: PHYSICIAN ASSISTANT

## 2020-08-06 PROCEDURE — 73610 X-RAY EXAM OF ANKLE: CPT

## 2020-08-06 PROCEDURE — 84145 PROCALCITONIN (PCT): CPT | Performed by: PHYSICIAN ASSISTANT

## 2020-08-06 PROCEDURE — 87070 CULTURE OTHR SPECIMN AEROBIC: CPT | Performed by: PHYSICIAN ASSISTANT

## 2020-08-06 PROCEDURE — G0427 INPT/ED TELECONSULT70: HCPCS | Performed by: INTERNAL MEDICINE

## 2020-08-06 PROCEDURE — 87186 SC STD MICRODIL/AGAR DIL: CPT | Performed by: PHYSICIAN ASSISTANT

## 2020-08-06 PROCEDURE — 83605 ASSAY OF LACTIC ACID: CPT | Performed by: EMERGENCY MEDICINE

## 2020-08-06 PROCEDURE — 99024 POSTOP FOLLOW-UP VISIT: CPT | Performed by: ORTHOPAEDIC SURGERY

## 2020-08-06 PROCEDURE — 85652 RBC SED RATE AUTOMATED: CPT | Performed by: PHYSICIAN ASSISTANT

## 2020-08-06 PROCEDURE — 87205 SMEAR GRAM STAIN: CPT | Performed by: PHYSICIAN ASSISTANT

## 2020-08-06 PROCEDURE — 87147 CULTURE TYPE IMMUNOLOGIC: CPT | Performed by: PHYSICIAN ASSISTANT

## 2020-08-06 PROCEDURE — 85379 FIBRIN DEGRADATION QUANT: CPT | Performed by: EMERGENCY MEDICINE

## 2020-08-06 PROCEDURE — 99285 EMERGENCY DEPT VISIT HI MDM: CPT

## 2020-08-06 PROCEDURE — 85027 COMPLETE CBC AUTOMATED: CPT | Performed by: EMERGENCY MEDICINE

## 2020-08-06 PROCEDURE — 96361 HYDRATE IV INFUSION ADD-ON: CPT

## 2020-08-06 PROCEDURE — 80053 COMPREHEN METABOLIC PANEL: CPT | Performed by: EMERGENCY MEDICINE

## 2020-08-06 PROCEDURE — 96360 HYDRATION IV INFUSION INIT: CPT

## 2020-08-06 PROCEDURE — 71045 X-RAY EXAM CHEST 1 VIEW: CPT

## 2020-08-06 PROCEDURE — 84484 ASSAY OF TROPONIN QUANT: CPT | Performed by: EMERGENCY MEDICINE

## 2020-08-06 PROCEDURE — 86140 C-REACTIVE PROTEIN: CPT | Performed by: PHYSICIAN ASSISTANT

## 2020-08-06 PROCEDURE — 87040 BLOOD CULTURE FOR BACTERIA: CPT | Performed by: EMERGENCY MEDICINE

## 2020-08-06 PROCEDURE — 85007 BL SMEAR W/DIFF WBC COUNT: CPT | Performed by: EMERGENCY MEDICINE

## 2020-08-06 RX ORDER — CEFAZOLIN SODIUM 2 G/50ML
2000 SOLUTION INTRAVENOUS EVERY 8 HOURS
Status: DISCONTINUED | OUTPATIENT
Start: 2020-08-06 | End: 2020-08-09

## 2020-08-06 RX ORDER — DILTIAZEM HYDROCHLORIDE 300 MG/1
300 CAPSULE, EXTENDED RELEASE ORAL DAILY
Status: DISCONTINUED | OUTPATIENT
Start: 2020-08-06 | End: 2020-08-06

## 2020-08-06 RX ORDER — ACETAMINOPHEN 325 MG/1
650 TABLET ORAL EVERY 6 HOURS PRN
Status: DISCONTINUED | OUTPATIENT
Start: 2020-08-06 | End: 2020-08-09 | Stop reason: HOSPADM

## 2020-08-06 RX ORDER — ONDANSETRON 2 MG/ML
4 INJECTION INTRAMUSCULAR; INTRAVENOUS EVERY 6 HOURS PRN
Status: DISCONTINUED | OUTPATIENT
Start: 2020-08-06 | End: 2020-08-09 | Stop reason: HOSPADM

## 2020-08-06 RX ORDER — SPIRONOLACTONE 25 MG/1
25 TABLET ORAL DAILY
Status: DISCONTINUED | OUTPATIENT
Start: 2020-08-06 | End: 2020-08-09 | Stop reason: HOSPADM

## 2020-08-06 RX ORDER — SODIUM CHLORIDE 9 MG/ML
100 INJECTION, SOLUTION INTRAVENOUS CONTINUOUS
Status: DISPENSED | OUTPATIENT
Start: 2020-08-06 | End: 2020-08-07

## 2020-08-06 RX ORDER — CEFEPIME HYDROCHLORIDE 2 G/1
2000 INJECTION, POWDER, FOR SOLUTION INTRAVENOUS EVERY 12 HOURS
Status: DISCONTINUED | OUTPATIENT
Start: 2020-08-06 | End: 2020-08-06

## 2020-08-06 RX ORDER — CEFEPIME HYDROCHLORIDE 2 G/50ML
2000 INJECTION, SOLUTION INTRAVENOUS EVERY 12 HOURS
Status: DISCONTINUED | OUTPATIENT
Start: 2020-08-06 | End: 2020-08-06

## 2020-08-06 RX ADMIN — SPIRONOLACTONE 25 MG: 25 TABLET ORAL at 10:20

## 2020-08-06 RX ADMIN — SODIUM CHLORIDE 1000 ML: 0.9 INJECTION, SOLUTION INTRAVENOUS at 04:33

## 2020-08-06 RX ADMIN — APIXABAN 5 MG: 5 TABLET, FILM COATED ORAL at 10:20

## 2020-08-06 RX ADMIN — CEFAZOLIN SODIUM 2000 MG: 2 SOLUTION INTRAVENOUS at 18:17

## 2020-08-06 RX ADMIN — SODIUM CHLORIDE 100 ML/HR: 0.9 INJECTION, SOLUTION INTRAVENOUS at 21:26

## 2020-08-06 RX ADMIN — CEFEPIME HYDROCHLORIDE 2000 MG: 2 INJECTION, SOLUTION INTRAVENOUS at 10:17

## 2020-08-06 RX ADMIN — SODIUM CHLORIDE 100 ML/HR: 0.9 INJECTION, SOLUTION INTRAVENOUS at 10:17

## 2020-08-06 RX ADMIN — DILTIAZEM HYDROCHLORIDE 300 MG: 180 CAPSULE, COATED, EXTENDED RELEASE ORAL at 10:20

## 2020-08-06 RX ADMIN — APIXABAN 5 MG: 5 TABLET, FILM COATED ORAL at 18:17

## 2020-08-06 NOTE — ASSESSMENT & PLAN NOTE
· Noted by tachycardia and leukocytosis on admission  · No sign of current infection  · Urinalysis negative, lactic acid 1 2  · Blood cultures pending  · Chest x-ray no acute disease  · Given 1 L IV fluids and cefepime 2 g in the ER  · Continue IV fluids and antibiotics for now  · Discussed with Dr Renee Akins via tiger text, plan for triple phase bone scan, check wound culture, Ancef and request Infectious Disease consult    Message sent to ID via tiger text

## 2020-08-06 NOTE — H&P
H&P- Gertrude Corbin 1950, 65 y o  male MRN: [id number]    Unit/Bed#: -01 Encounter: [id number]    Primary Care Provider: Vanesa Renteria DO   Date and time admitted to hospital: 8/6/2020  3:32 AM        * SIRS (systemic inflammatory response syndrome) ([facility] [address] )  Assessment & Plan  · Noted by tachycardia and leukocytosis on admission  · No sign of current infection  · Urinalysis negative, lactic acid 1 2  · Blood cultures pending  · Chest x-ray no acute disease  · Given 1 L IV fluids and cefepime 2 g in the ER  · Continue IV fluids and antibiotics for now  · Discussed with Dr Vera Rayo via tiger text, plan for triple phase bone scan, check wound culture, Ancef and request Infectious Disease consult  Message sent to ID via tiger text    Weakness generalized  Assessment & Plan  · PT/OT for discharge planning    Thrombocytosis ([facility] [address] )  Assessment & Plan  · Suspect reactive    Closed displaced bimalleolar fracture of left lower leg with routine healing  Assessment & Plan  · Orthopedic consult while in the hospital  · Will check an x-ray of the ankle  · Discussed with Dr Vera Rayo, plan for triple phase bone scan, check wound culture, Ancef and request Infectious Disease consult    Persistent atrial fibrillation ([facility] [address] )  Assessment & Plan  · Continue Cardizem for rate control  · Continue Eliquis for anticoagulation    Essential hypertension  Assessment & Plan  · Continue Cardizem  · Monitor blood pressure    VTE Prophylaxis: Apixaban (Eliquis)  Code Status: full code  POLST: POLST is not applicable to this patient  Discussion with patient, tiger text sent to Dr Vera Rayo    Anticipated Length of Stay:  Patient will be admitted on an Observation basis with an anticipated length of stay of  < 2 midnights  Justification for Hospital Stay:  SIRS workup in progress    Chief Complaint:   Weakness    History of Present Illness:    Gertrude Corbin is a 65 y o  male who presents with weakness    Patient with past medical history of hypertension, atrial fibrillation on anticoagulation, had by malleolar fracture repaired June 6, 2020 with a dehiscence of the wound was seen by Orthopedics as an outpatient presents to the ER secondary to weakness  He had a fall approximately 5 days ago with no head injury or loss of consciousness  He has had no fevers, chills, nausea, vomiting, chest pain, shortness breath, abdominal pain, dysuria  He still has weakness in his left ankle from the surgery  He feels very wiped out  Patient reports Monday he fell when he was emptying the trash, lost his balance, knocked over wife  No LOC, had some pain/tenderness in ankle  Due to see Dr Kalina Schroeder in 2 weeks  Still has open wound w/ scant yellow drainage  He was not able to get up steps secondary to weakness  Has lymphedema w/ chronic swelling L>R  No sick contacts  Review of Systems:  Review of Systems   Constitutional: Positive for fatigue  Negative for chills and fever  HENT: Negative for rhinorrhea, sore throat and trouble swallowing  Eyes: Negative for discharge and redness  Respiratory: Negative for cough and shortness of breath  Cardiovascular: Positive for leg swelling  Negative for chest pain  Gastrointestinal: Negative for abdominal pain, diarrhea, nausea and vomiting  Genitourinary: Negative for difficulty urinating, dysuria and hematuria  Musculoskeletal: Positive for arthralgias and joint swelling  Negative for back pain and myalgias  Skin: Positive for wound  Negative for rash  Neurological: Positive for weakness  Past Medical and Surgical History:   Past Medical History:   Diagnosis Date    Atrial fibrillation ([facility] [address] )     Hypertension        Past Surgical History:   Procedure Laterality Date    CATARACT EXTRACTION, BILATERAL      HERNIA REPAIR      umbilical    ORIF TIBIA & FIBULA FRACTURES Left 5/26/2020    Procedure: OPEN REDUCTION W/ INTERNAL FIXATION (ORIF) ANKLE;  Surgeon: Cammy Benavides;   Location: [address] [place] [address] MAIN OR;  Service: Orthopedics       Meds/Allergies:  Prior to Admission medications    Medication Sig Start Date End Date Taking? Authorizing Provider   acetaminophen (TYLENOL) 325 mg tablet Take 2 tablets (650 mg total) by mouth every 6 (six) hours as needed (Pain) 6/5/20   Mercedes Silverio MD   apixaban (ELIQUIS) 5 mg Take 5 mg by mouth 2 (two) times a day 3/25/20   Historical Provider, MD   diltiazem (TIAZAC) 300 MG 24 hr capsule Take 300 mg by mouth daily    Historical Provider, MD   Gauze Bandages (JUAN FLUFF ROLLS) MISC by Does not apply route daily 7/7/20   Diane Wang MD   spironolactone (ALDACTONE) 25 mg tablet Take 25 mg by mouth daily 3/25/20 3/25/21  Historical Provider, MD     I have reviewed home medications with patient personally  Allergies: No Known Allergies    Social History:  Marital Status: /Civil Union   Occupation: retired  Patient Pre-hospital Living Situation: home  Patient Pre-hospital Level of Mobility: currently using scooter and walker at home  Patient Pre-hospital Diet Restrictions: None  Substance Use History:   Social History     Substance and Sexual Activity   Alcohol Use Not Currently    Frequency: Never     Social History     Tobacco Use   Smoking Status Never Smoker   Smokeless Tobacco Never Used     Social History     Substance and Sexual Activity   Drug Use Never       Family History:  I have reviewed the patients family history    Physical Exam:   Vitals:   Blood Pressure: 127/60 (08/06/20 0736)  Pulse: (!) 114 (08/06/20 0736)  Temperature: 98 4 °F (36 9 °C) (08/06/20 0736)  Temp Source: Temporal (08/06/20 0736)  Respirations: 18 (08/06/20 0736)  Height: 5' 10" (177 8 cm) (08/06/20 0736)  Weight - Scale: 86 9 kg (191 lb 9 3 oz) (08/06/20 0810)  SpO2: 97 % (08/06/20 0736)    Physical Exam   Constitutional: He appears well-developed  Patient was very weak to sit forward, took assistance to help leaning forward off the bed    He is Sleepy, report was up most of night in ER    HENT:   Head: Normocephalic and atraumatic  Nose: Nose normal    Eyes: Conjunctivae are normal  Right eye exhibits no discharge  Left eye exhibits no discharge  Neck: Normal range of motion  No tracheal deviation present  Cardiovascular: Normal heart sounds  An irregularly irregular rhythm present  Tachycardia present  Exam reveals no gallop and no friction rub  No murmur heard  Pulmonary/Chest: Effort normal and breath sounds normal  No respiratory distress  He has no wheezes  He has no rales  Abdominal: Soft  Normal appearance and bowel sounds are normal  He exhibits no distension and no mass  There is no abdominal tenderness  There is no guarding  Musculoskeletal: Normal range of motion  General: No tenderness or deformity  Left lower leg: Edema present  Comments: Right leg w/ venadyne   Neurological: He is alert  Speech intact, moving extremities   Skin: Skin is warm and dry  No rash noted  No erythema  No pallor  Left lateral lower leg w/ approx 1 5 inch dressing in place, clean and dry   Psychiatric: His behavior is normal  Judgment and thought content normal    Nursing note and vitals reviewed  Additional Data:   Lab Results: I have personally reviewed pertinent reports  Results from last 7 days   Lab Units 08/06/20  0430   WBC Thousand/uL 22 90*   HEMOGLOBIN g/dL 15 0   HEMATOCRIT % 43 9   PLATELETS Thousands/uL 563*   LYMPHO PCT % 6*   MONO PCT % 5   EOS PCT % 1     Results from last 7 days   Lab Units 08/06/20  0430   SODIUM mmol/L 137   POTASSIUM mmol/L 4 3   CHLORIDE mmol/L 106   CO2 mmol/L 22   BUN mg/dL 19   CREATININE mg/dL 0 95   CALCIUM mg/dL 9 2   ALK PHOS U/L 76   ALT U/L 23   AST U/L 30     Imaging: I have personally reviewed pertinent reports      XR chest 1 view portable   ED Interpretation by Kate Cervantes MD (08/06 1082)   CXR: (my "wet" read interpretation) no acute cardiopulmonary abnormalities identified          Final Result by Sachi Souza Eleazar Peter MD (08/06 3111)      No acute cardiopulmonary disease  Workstation performed: [id number]         XR ankle 3+ vw left    (Results Pending)   NM inpatient order    (Results Pending)     Epic Records Reviewed: Yes     ** Please Note: This note has been constructed using a voice recognition system   **

## 2020-08-06 NOTE — ED PROVIDER NOTES
History  Chief Complaint   Patient presents with    Weakness - Generalized     worsening past 5 days  pt had left ankle fx repair at this facility 6/6/2020 and has been improving since but recently, last five days, slow decline "I'm just wiped out"     5 days of feeling lethargic and generally weak  Had a fall five days ago, did not strike his head, no loc, no neck pain  Is still wearing splint from tib-fib repair and notes that he has not recovered stability/mobility  No f/c/n/v/cp/sob/abdo pain/urinary/bowel symp  Apart from left ankle, no focal motor weakness, and no sensory deficits  Prior to Admission Medications   Prescriptions Last Dose Informant Patient Reported? Taking? Gauze Bandages (JUAN FLUFF ROLLS) MISC   No No   Sig: by Does not apply route daily   acetaminophen (TYLENOL) 325 mg tablet   No No   Sig: Take 2 tablets (650 mg total) by mouth every 6 (six) hours as needed (Pain)   apixaban (ELIQUIS) 5 mg   Yes No   Sig: Take 5 mg by mouth 2 (two) times a day   diltiazem (TIAZAC) 300 MG 24 hr capsule   Yes No   Sig: Take 300 mg by mouth daily   spironolactone (ALDACTONE) 25 mg tablet   Yes No   Sig: Take 25 mg by mouth daily      Facility-Administered Medications: None       Past Medical History:   Diagnosis Date    Atrial fibrillation ([facility] [address] )     Hypertension        Past Surgical History:   Procedure Laterality Date    ORIF TIBIA & FIBULA FRACTURES Left 5/26/2020    Procedure: OPEN REDUCTION W/ INTERNAL FIXATION (ORIF) ANKLE;  Surgeon: Herman Moe; Location: [address] [place] [address] OR;  Service: Orthopedics       History reviewed  No pertinent family history  I have reviewed and agree with the history as documented      E-Cigarette/Vaping    E-Cigarette Use Never User      E-Cigarette/Vaping Substances    Nicotine No     THC No     CBD No     Flavoring No     Other No     Unknown No      Social History     Tobacco Use    Smoking status: Never Smoker    Smokeless tobacco: Never Used   Substance Use Topics    Alcohol use: Not Currently     Frequency: Never    Drug use: Never       Review of Systems   Constitutional: Positive for fatigue  Negative for fever  HENT: Negative for rhinorrhea  Eyes: Negative for visual disturbance  Respiratory: Negative for shortness of breath  Cardiovascular: Negative for chest pain  Gastrointestinal: Negative for abdominal pain, diarrhea and vomiting  Endocrine: Negative for polydipsia  Genitourinary: Negative for dysuria, frequency and hematuria  Musculoskeletal: Negative for neck stiffness  Skin: Negative for rash  Allergic/Immunologic: Negative for immunocompromised state  Neurological: Negative for speech difficulty, weakness and numbness  Psychiatric/Behavioral: Negative for suicidal ideas  Physical Exam  Physical Exam  Constitutional:       General: He is not in acute distress  HENT:      Head: Normocephalic and atraumatic  Eyes:      Conjunctiva/sclera: Conjunctivae normal    Neck:      Musculoskeletal: Normal range of motion and neck supple  Cardiovascular:      Rate and Rhythm: Regular rhythm  Heart sounds: Normal heart sounds  Pulmonary:      Effort: Pulmonary effort is normal       Breath sounds: Normal breath sounds  Abdominal:      General: Bowel sounds are normal       Palpations: Abdomen is soft  There is no mass  Tenderness: There is no abdominal tenderness  There is no guarding  Skin:     General: Skin is warm and dry  Neurological:      General: No focal deficit present  Mental Status: He is alert and oriented to person, place, and time  Cranial Nerves: No cranial nerve deficit  Sensory: No sensory deficit  Motor: No weakness     Psychiatric:      Comments: Poor attention: trails off while answering questions         Vital Signs  ED Triage Vitals   Temp Pulse Respirations Blood Pressure SpO2   -- 08/06/20 0332 08/06/20 0332 08/06/20 0332 08/06/20 0332    (!) 117 18 127/94 98 % Temp src Heart Rate Source Patient Position - Orthostatic VS BP Location FiO2 (%)   -- 08/06/20 0530 -- 08/06/20 0530 --    Monitor  Left arm       Pain Score       08/06/20 0332       2           Vitals:    08/06/20 0332 08/06/20 0530   BP: 127/94 136/76   Pulse: (!) 117 (!) 108         Visual Acuity      ED Medications  Medications   sodium chloride 0 9 % bolus 1,000 mL (1,000 mL Intravenous New Bag 8/6/20 0433)       Diagnostic Studies  Results Reviewed     Procedure Component Value Units Date/Time    D-dimer, quantitative [[id number]]  (Normal) Resulted:  08/06/20 0547    Lab Status:  Final result Specimen:  Blood Updated:  08/06/20 0547     D-Dimer, Quant 0 39 ug/ml FEU     Comprehensive metabolic panel [[id number]]  (Abnormal) Collected:  08/06/20 0430    Lab Status:  Final result Specimen:  Blood from Arm, Right Updated:  08/06/20 0513     Sodium 137 mmol/L      Potassium 4 3 mmol/L      Chloride 106 mmol/L      CO2 22 mmol/L      ANION GAP 9 mmol/L      BUN 19 mg/dL      Creatinine 0 95 mg/dL      Glucose 104 mg/dL      Calcium 9 2 mg/dL      AST 30 U/L      ALT 23 U/L      Alkaline Phosphatase 76 U/L      Total Protein 6 7 g/dL      Albumin 3 7 g/dL      Total Bilirubin 1 20 mg/dL      eGFR 80 ml/min/1 73sq m     Narrative:       Chase guidelines for Chronic Kidney Disease (CKD):     Stage 1 with normal or high GFR (GFR > 90 mL/min/1 73 square meters)    Stage 2 Mild CKD (GFR = 60-89 mL/min/1 73 square meters)    Stage 3A Moderate CKD (GFR = 45-59 mL/min/1 73 square meters)    Stage 3B Moderate CKD (GFR = 30-44 mL/min/1 73 square meters)    Stage 4 Severe CKD (GFR = 15-29 mL/min/1 73 square meters)    Stage 5 End Stage CKD (GFR <15 mL/min/1 73 square meters)  Note: GFR calculation is accurate only with a steady state creatinine    Troponin I [[id number]]  (Normal) Collected:  08/06/20 0430    Lab Status:  Final result Specimen:  Blood from Arm, Right Updated:  08/06/20 1533 Troponin I <0 03 ng/mL     Lactic acid [[id number]]  (Normal) Collected:  08/06/20 0430    Lab Status:  Final result Specimen:  Blood from Arm, Right Updated:  08/06/20 0505     LACTIC ACID 1 2 mmol/L     Narrative:       Result may be elevated if tourniquet was used during collection  UA w Reflex to Microscopic w Reflex to Culture [[id number]]  (Abnormal) Collected:  08/06/20 0430    Lab Status:  Final result Specimen:  Urine, Clean Catch Updated:  08/06/20 0457     Color, UA Yellow     Clarity, UA Clear     Specific [place], UA 1 020     pH, UA 7 0     Leukocytes, UA Negative     Nitrite, UA Negative     Protein, UA Negative mg/dl      Glucose, UA Negative mg/dl      Ketones, UA Trace mg/dl      Urobilinogen, UA 2 0 E U /dl      Bilirubin, UA Negative     Blood, UA Negative    CBC and differential [[id number]]  (Abnormal) Collected:  08/06/20 0430    Lab Status:  Final result Specimen:  Blood from Arm, Right Updated:  08/06/20 0457     WBC 22 90 Thousand/uL      RBC 4 44 Million/uL      Hemoglobin 15 0 g/dL      Hematocrit 43 9 %      MCV 99 fL      MCH 33 7 pg      MCHC 34 1 g/dL      RDW 14 3 %      MPV 8 1 fL      Platelets 819 Thousands/uL     Blood culture #1 [[id number]] Collected:  08/06/20 0430    Lab Status: In process Specimen:  Blood from Arm, Right Updated:  08/06/20 0447    Blood culture #2 [[id number]] Collected:  08/06/20 0430    Lab Status: In process Specimen:  Blood from Arm, Right Updated:  08/06/20 0447                 XR chest 1 view portable   ED Interpretation by Rachel Soria MD (08/06 2776)   CXR: (my "wet" read interpretation) no acute cardiopulmonary abnormalities identified                     Procedures  Procedures         ED Course       US AUDIT      Most Recent Value   Initial Alcohol Screen: US AUDIT-C    1  How often do you have a drink containing alcohol?  0 Filed at: 08/06/2020 0333   2   How many drinks containing alcohol do you have on a typical day you are drinking?   0 Filed at: 08/06/2020 0333   3a  Male UNDER 65: How often do you have five or more drinks on one occasion? 0 Filed at: 08/06/2020 0333   3b  FEMALE Any Age, or MALE 65+: How often do you have 4 or more drinks on one occassion? 0 Filed at: 08/06/2020 0333   Audit-C Score  0 Filed at: 08/06/2020 3976                  IRMA/DAST-10      Most Recent Value   How many times in the past year have you    Used an illegal drug or used a prescription medication for non-medical reasons? Never Filed at: 08/06/2020 8615                                MDM  Number of Diagnoses or Management Options  Diagnosis management comments: General malaise, weakness  Will check cxr, labs, re-eval        Disposition  Final diagnoses:   Generalized weakness   Dehydration   Tachycardia     Time reflects when diagnosis was documented in both MDM as applicable and the Disposition within this note     Time User Action Codes Description Comment    8/6/2020  6:40 AM Tavia Martinez Add [R53 1] Generalized weakness     8/6/2020  6:40 AM Tavia Martinez Add [E86 0] Dehydration     8/6/2020  6:40 AM Valentina Fam Add [R00 0] Tachycardia       ED Disposition     ED Disposition Condition Date/Time Comment    Admit Stable Thu Aug 6, 2020  6:40 AM Case was discussed with vicky and the patient's admission status was agreed to be Admission Status: observation status to the service of Dr Kofi Witt   Follow-up Information    None         Patient's Medications   Discharge Prescriptions    No medications on file     No discharge procedures on file      PDMP Review       Value Time User    PDMP Reviewed  Yes 6/5/2020  2:23 PM Haydee Archibald MD          ED Provider  Electronically Signed by           Tyler Herrera MD  08/06/20 [address], MD  08/06/20 7029

## 2020-08-06 NOTE — CONSULTS
Orthopedics   Dieudonne Espino 65 y o  male MRN: [id number]  Unit/Bed#: -01      Chief Complaint:   S/P left ankle ORIF, now with SIRS  HPI:  65 y o  male seen in consultation ordered by Suan Apley PA-C for evaluation s/p left ankle ORIF performed 5/26/20 by Dr Krista Williamson  The patient had a fairly uncomplicated recovery but did have wound dehiscence of the proximal portion of the lateral wound  The patient was seen and examined by Dr Krista Williamson on 7/8/20 who prescribed an 7 day course of Keflex and suggested wet-to-dry dressings  The patient was seen again on 7/16/20 by Dr Krista Williamson due to concerns with the incision who reassured the patient and instructed him to follow-up with Dr Enoc Baird, who was assuming care of this patient, as previous scheduled  This morning, the patient presented to [facility] for complaints of generalized weakness and a fall that occurred 5 days prior  Lab work was significant for leukocytosis and vital showed tachycardia  He denies any ankle pain at this time  He does report he felt feverish yesterday but denies chills or rigors  He reports he and his wife have noted thin, yellowish fluid from the incision but denies any gross purulence or erythema of the incision  He has been weight the CAM boot as instructed  He denies numbness and tingling distally  Review Of Systems:   · Skin: Normal  · Neuro: See HPI  · Musculoskeletal: See HPI  · 14 point review of systems negative except as stated above     Past Medical History:   Past Medical History:   Diagnosis Date    Atrial fibrillation ([address] )     Hypertension        Past Surgical History:   Past Surgical History:   Procedure Laterality Date    CATARACT EXTRACTION, BILATERAL      HERNIA REPAIR      umbilical    ORIF TIBIA & FIBULA FRACTURES Left 5/26/2020    Procedure: OPEN REDUCTION W/ INTERNAL FIXATION (ORIF) ANKLE;  Surgeon: Klarissa Hoang;   Location: [address] [place] [address] MAIN OR;  Service: Orthopedics       Family History:  Family history reviewed and non-contributory  Family History   Problem Relation Age of Onset    No Known Problems Mother        Social History:  Social History     Socioeconomic History    Marital status: /Civil Union     Spouse name: None    Number of children: None    Years of education: None    Highest education level: None   Occupational History    None   Social Needs    Financial resource strain: None    Food insecurity     Worry: None     Inability: None    Transportation needs     Medical: None     Non-medical: None   Tobacco Use    Smoking status: Never Smoker    Smokeless tobacco: Never Used   Substance and Sexual Activity    Alcohol use: Not Currently     Frequency: Never    Drug use: Never    Sexual activity: Not Currently     Partners: Female   Lifestyle    Physical activity     Days per week: None     Minutes per session: None    Stress: None   Relationships    Social connections     Talks on phone: None     Gets together: None     Attends [employer] service: None     Active member of club or organization: None     Attends meetings of clubs or organizations: None     Relationship status: None    Intimate partner violence     Fear of current or ex partner: None     Emotionally abused: None     Physically abused: None     Forced sexual activity: None   Other Topics Concern    None   Social History Narrative    None       Allergies:   No Known Allergies        Labs:  0   Lab Value Date/Time    HCT 43 9 08/06/2020 0430    HCT 42 9 06/01/2020 0625    HCT 42 8 05/29/2020 0534    HGB 15 0 08/06/2020 0430    HGB 15 0 06/01/2020 0625    HGB 14 7 05/29/2020 0534    INR 1 46 (H) 05/25/2020 0617    WBC 22 90 (H) 08/06/2020 0430    WBC 13 00 (H) 06/01/2020 0625    WBC 16 00 (H) 05/29/2020 0534    ESR 11 08/06/2020 0952    CRP 21 2 (H) 08/06/2020 0952       Meds:    Current Facility-Administered Medications:     acetaminophen (TYLENOL) tablet 650 mg, 650 mg, Oral, Q6H PRN, BETH Fairchild   apixaban (ELIQUIS) tablet 5 mg, 5 mg, Oral, BID, Rosalva A Meckes, CRNP, 5 mg at 08/06/20 1020    ceFAZolin (ANCEF) IVPB (premix) 2,000 mg 50 mL, 2,000 mg, Intravenous, Q8H, Danish Gray PA-C    diltiazem (CARDIZEM CD) 24 hr capsule 300 mg, 300 mg, Oral, Daily, Rosalva A Josekes, CRNP, 300 mg at 08/06/20 1020    ondansetron (ZOFRAN) injection 4 mg, 4 mg, Intravenous, Q6H PRN, Rosalva A Meckes, CRNP    sodium chloride 0 9 % infusion, 100 mL/hr, Intravenous, Continuous, Rosalva Kirkland, CRNP, Last Rate: 100 mL/hr at 08/06/20 1017, 100 mL/hr at 08/06/20 1017    spironolactone (ALDACTONE) tablet 25 mg, 25 mg, Oral, Daily, Rosalva A Josekes, CRNP, 25 mg at 08/06/20 1020    Blood Culture:   Lab Results   Component Value Date    BLOODCX Received in Microbiology Lab  Culture in Progress  08/06/2020    BLOODCX Received in Microbiology Lab  Culture in Progress  08/06/2020       Wound Culture:   No results found for: WOUNDCULT    Ins and Outs:  I/O last 24 hours: In: -   Out: 200 [Urine:200]          Physical Exam:   /60 (BP Location: Left arm)   Pulse (!) 114   Temp 98 4 °F (36 9 °C) (Temporal)   Resp 18   Ht 5' 10" (1 778 m)   Wt 86 9 kg (191 lb 9 3 oz)   SpO2 97%   BMI 27 49 kg/m²   Gen: Alert and oriented to person, place, time  HEENT: EOMI, eyes clear, moist mucus membranes, hearing intact  Respiratory: Bilateral chest rise  No audible wheezing found  Cardiovascular: Regular Rate and Rhythm  Abdomen: soft nontender/nondistended  Musculoskeletal: left lower extremity  · Medial cicatrix is well healed and benign  No erythema  · Lateral incision is mostly healed  There is a small, approximately 1 cm area of the proximal aspect of the incision that is open  No erythema, no purulence, no active drainage  See separate photo in the media tab taken by myself today at bedside  · No tenderness over the medial or lateral malleoli  · Left lower limb swelling as to be expected post-injury, no erythema  · Motor intact 5/5 strength with ankle dorsi/plantar flexion, EHL/FHL  · Thigh and calf compartments are soft and compressible without pain  · 2+ DP pulse, limb is warm and well perfused with good color and capillary refill  Tertiary: no tenderness over all other joints/long bones as except already stated  Radiology:   No new imaging to review  Assessment:  70 y o male S/P left ankle ORIF performed by Dr Jocy Thornton on 5/26/20 without obvious evidence of injection  Plan:   · WBAT LLE in CAM boot  · XRs of the left ankle have been ordered, will follow-up on once they have been performed  · Will follow wound culture performed today  · Right now, I do not see any obvious evidence that the lateral incision is infected  Will continue to monitor  · PT/OT for ambulatory dysfunction/weakness  · Medical mgt per primary team  · Awaiting ID rec's  · Dispo: Ortho will follow         Rachel Zuñiga PA-C

## 2020-08-06 NOTE — ASSESSMENT & PLAN NOTE
· Orthopedic consult while in the hospital  · Will check an x-ray of the ankle  · Discussed with Dr Andrew Gooden, plan for triple phase bone scan, check wound culture, Ancef and request Infectious Disease consult

## 2020-08-06 NOTE — SOCIAL WORK
LOS: 1, URR: n/a, not a re-admit  CM met with pt at bedside and explained role  Pt reports he lives with his spouse Jocy Gil in a 2 story house; [address], 16 steps inside  Bedroom and bathroom on second floor  Pt reports he primarily uses scooter in the home for mobility since ankle fracture 5/20  Pt also has RW and cane at home  Pt reports he is independent with ADLs in the home at baseline  Pt states he's been having difficulty caring for himself past 5 days due to feeling generally weak  Pt reports he fell on Monday 8/3 but was able to get up on his own with assistance from spouse  Pt PCP is [facility]  He uses [facility] for medications and denies any barriers to obtaining them  Pt had STR on ARU following ankle fracture 5/20 and was d/c home 6/6  Pt has Gud[facility] through [facility] and then transitioned to OP PT  CM awaiting PT/OT consults for recommendations  Pt states he would like to go home on discharge with Gudelia [facility]  Will await therapy and re-assess  Pt denies history of MH and DA& treatment  Spouse will transport if returning home  CM to follow  Patient/caregiver received discharge checklist   Content reviewed  Patient/caregiver encouraged to participate in discharge plan of care prior to discharge home  CM reviewed d/c planning process including the following: identifying help at home, patient preference for d/c planning needs, availability of treatment team to discuss questions or concerns patient and/or family may have regarding understanding medications and recognizing signs and symptoms once discharged  CM also encouraged patient to follow up with all recommended appointments after discharge  Patient advised of importance for patient and family to participate in managing patients medical well being

## 2020-08-06 NOTE — CONSULTS
REQUIRED DOCUMENTATION:     1  This service was provided via Telemedicine  2  Provider located at [address] [place] [address]  3  TeleMed provider: Saida Tran DO   4  Identify all parties in room with patient during tele consult: EVA Lin  5  After connecting through [employer], patient was identified by name and date of birth and assistant checked wristband  Patient was then informed that this was a Telemedicine visit and that the exam was being conducted confidentially over secure lines  My office door was closed  No one else was in the room  Patient acknowledged consent and understanding of privacy and security of the Telemedicine visit, and gave us permission to have the assistant stay in the room in order to assist with the history and to conduct the exam   I informed the patient that I have reviewed their record in Epic and presented the opportunity for them to ask any questions regarding the visit today  The patient agreed to participate  TeleConsultation - Infectious Disease   Hannahkatie Espino 65 y o  male MRN: [id number]  Unit/Bed#: -01 Encounter: [id number]    IMPRESSION:   · Leukocytosis, tachycardia  Unclear cause of leukocytosis  I wonder if this could be reactive to the injury/inflammation sustained after his recent fall  Pt does not appear septic or toxic presently  Noted that patient had a similar extent of leukocytosis towards the end of May 2020  Noted lactate level and ESR are within normal limits  · 5/26 s/p LT ankle ORIF  LT ankle wound reported to have scant drainage per RN without purulence  Wound infection needs to be excluded  Pt completed a 7-day course of cephalexin last month  · Elevated CRP, 21 2:  Noted elevation of nonspecific inflammatory markers  · Atrial fibrillation with RVR:  On Eliquis  RECOMMENDATIONS:   Check MRSA nares screen   Follow left ankle wound culture   Follow blood culture   Follow left ankle x-ray    Consider bone scan, although I will defer to Orthopedic Service   Continue cefazolin 2 g IV q 8 hours pending culture results   Monitor clinical response, temperature curve, WBC count  Extensive review of the medical records in epic including review of the notes, radiographs, and laboratory results  My recommendations were discussed with the patient in detail who verbalized understanding  My recommendations were discussed with JUAN Pastor, from the primary service  Thank you for allowing me to participate in the care of this patient  The ID service will follow  HISTORY OF PRESENT ILLNESS:  Reason for Consult: SIRS  HPI: Mynor Sanchez is a 65y o  year old man with PMH of atrial fibrillation who was admitted today with generalized weakness and fall that occurred 5 days ago  He fell as he had lost his balance while he was removing a trash bag from the trash can  He fell on his LT side including his ankle and buttocks  Initial hospital workup demonstrated the patient was tachycardic and had  leukocytosis  Of, note the patient recently left ankle ORIF on 05/26  This was later complicated by wound dehiscence  He was then prescribed a 7 day course of cephalexin on 7/8/20 by his orthopedist   He felt feverish yesterday without chills or sweats  He had a low-grade fever yesterday of 100 F  He reports a thin, yellowish fluid from his incision site  His LT ankle feels fine, he denies ankle pain currently  He wears a boot on his ankle and has been advised to be nonweightbearing on his left leg  REVIEW OF SYSTEMS:  Constitutional:  Positive for fatigue, fever  Negative for chills  HENT: Negative for congestion, runny nose, sore throat  Eyes: Negative for visual disturbance  Respiratory: Negative for cough, shortness of breath  Cardiovascular:  Positive for palpitations  Negative for chest pain  Gastrointestinal: Negative for abdominal pain, diarrhea, nausea, vomiting     Genitourinary: Negative for dysuria, hematuria, urine frequency, urgency  Musculoskeletal:  Positive for LT ankle swelling  Negative for arthralgias  Neurological: Negative for speech difficulty, headache, numbness/tingling  Psychiatric/Behavioral: Negative for depression, anxiety  PAST MEDICAL HISTORY:  Past Medical History:   Diagnosis Date    Atrial fibrillation ([address] )     Hypertension      Past Surgical History:   Procedure Laterality Date    CATARACT EXTRACTION, BILATERAL      HERNIA REPAIR      umbilical    ORIF TIBIA & FIBULA FRACTURES Left 5/26/2020    Procedure: OPEN REDUCTION W/ INTERNAL FIXATION (ORIF) ANKLE;  Surgeon: Nu Sierra; Location: [facility] MAIN OR;  Service: Orthopedics     FAMILY HISTORY: Negative for immunodeficiency    SOCIAL HISTORY:  Social History   Social History     Substance and Sexual Activity   Alcohol Use Not Currently    Frequency: Never     Social History     Substance and Sexual Activity   Drug Use Never     Social History     Tobacco Use   Smoking Status Never Smoker   Smokeless Tobacco Never Used    Lives at home with his wife  Has 1 daughter  Has 1 pet dog  ALLERGIES:  No Known Allergies    MEDICATIONS:  All current active medications have been reviewed    Scheduled Meds:acetaminophen, 650 mg, Oral, Q6H PRN, BETH Melgar  apixaban, 5 mg, Oral, BID, BETH Melgar  cefazolin, 2,000 mg, Intravenous, Q8H, Zakia Pastor PA-C  diltiazem, 300 mg, Oral, Daily, BETH Melgar  ondansetron, 4 mg, Intravenous, Q6H PRN, JORGE MelgarNP  sodium chloride, 100 mL/hr, Intravenous, Continuous, BETH Melgar, Last Rate: 100 mL/hr (08/06/20 1017)  spironolactone, 25 mg, Oral, Daily, BETH Melgar      Continuous Infusions:sodium chloride, 100 mL/hr, Last Rate: 100 mL/hr (08/06/20 1017)      PRN Meds:   acetaminophen    ondansetron    PHYSICAL EXAM:  Temp:  [98 4 °F (36 9 °C)] 98 4 °F (36 9 °C)  HR:  [] 114  Resp:  [18] 18  BP: (127-141)/(60-94) 127/60  SpO2:  [95 %-98 %] 97 %  Temp (24hrs), Av 4 °F (36 9 °C), Min:98 4 °F (36 9 °C), Max:98 4 °F (36 9 °C)  Current: Temperature: 98 4 °F (36 9 °C)    Intake/Output Summary (Last 24 hours) at 2020 1419  Last data filed at 2020 1177  Gross per 24 hour   Intake --   Output 200 ml   Net -200 ml     Limited exam due to telehealth visit which was mostly done by the patient's nurse  General Appearance:  Nontoxic, in no acute distress   Head:  Normocephalic, without obvious abnormality, atraumatic   Eyes:  EOMI, Conjunctiva pink and sclera anicteric, both eyes   Nose: Nares normal, mucosa normal, no drainage   Throat: Oropharynx moist   Neck: Supple   Lungs:   Symmetric chest expansion bilateral,respirations unlabored   Heart:  S1, S2, irregular rate, HR 80 per RN   Abdomen:   Soft, nontender per RN   : No Baron catheter present   Extremities: +1 pitting distal leg edema bilateral   Skin: LT ankle wound incision noted, nearly healed  Scant amount of yellowish drainage from left ankle wound is noted  No rash  Neurologic: Alert and oriented times 3, moves all extremities spontaneously x4     LABS, IMAGING, & OTHER STUDIES:  Lab Results:  I have personally reviewed pertinent labs  Results from last 7 days   Lab Units 20  0430   WBC Thousand/uL 22 90*   HEMOGLOBIN g/dL 15 0   PLATELETS Thousands/uL 563*     Results from last 7 days   Lab Units 20  0430   POTASSIUM mmol/L 4 3   CHLORIDE mmol/L 106   CO2 mmol/L 22   BUN mg/dL 19   CREATININE mg/dL 0 95   EGFR ml/min/1 73sq m 80   CALCIUM mg/dL 9 2   AST U/L 30   ALT U/L 23   ALK PHOS U/L 76     Results from last 7 days   Lab Units 20  0430   BLOOD CULTURE  Received in Microbiology Lab  Culture in Progress  Received in Microbiology Lab  Culture in Progress           Results from last 7 days   Lab Units 20  0952   CRP mg/L 21 2*         Results from last 7 days   Lab Units 20  0547   D-DIMER QUANTITATIVE ug/ml FEU 0 39     Imaging Studies:   XR LT ankle: pending    VIRTUAL VISIT DISCLAIMER  The patient has consented to an online visit or consultation  The patient understands that the online visit is based solely on information provided by them, and that, in the absence of a face-to-face physical evaluation by the physician, the diagnosis they receive are both limited and provisional in terms of accuracy and completeness  This is not intended to replace a full medical face-to-face evaluation by the physician

## 2020-08-07 ENCOUNTER — APPOINTMENT (INPATIENT)
Dept: NUCLEAR MEDICINE | Facility: HOSPITAL | Age: 70
DRG: 949 | End: 2020-08-07
Payer: COMMERCIAL

## 2020-08-07 LAB
ANION GAP SERPL CALCULATED.3IONS-SCNC: 5 MMOL/L (ref 4–13)
BASOPHILS # BLD AUTO: 0.1 THOUSANDS/ΜL (ref 0–0.1)
BASOPHILS NFR BLD AUTO: 0 % (ref 0–2)
BUN SERPL-MCNC: 10 MG/DL (ref 7–25)
CALCIUM SERPL-MCNC: 8.7 MG/DL (ref 8.6–10.5)
CHLORIDE SERPL-SCNC: 110 MMOL/L (ref 98–107)
CO2 SERPL-SCNC: 26 MMOL/L (ref 21–31)
CREAT SERPL-MCNC: 0.88 MG/DL (ref 0.7–1.3)
EOSINOPHIL # BLD AUTO: 0.7 THOUSAND/ΜL (ref 0–0.61)
EOSINOPHIL NFR BLD AUTO: 5 % (ref 0–5)
ERYTHROCYTE [DISTWIDTH] IN BLOOD BY AUTOMATED COUNT: 14.4 % (ref 11.5–14.5)
GFR SERPL CREATININE-BSD FRML MDRD: 86 ML/MIN/1.73SQ M
GLUCOSE P FAST SERPL-MCNC: 85 MG/DL (ref 65–99)
GLUCOSE SERPL-MCNC: 85 MG/DL (ref 65–99)
HCT VFR BLD AUTO: 40.1 % (ref 42–47)
HGB BLD-MCNC: 13.9 G/DL (ref 14–18)
LYMPHOCYTES # BLD AUTO: 2.3 THOUSANDS/ΜL (ref 0.6–4.47)
LYMPHOCYTES NFR BLD AUTO: 16 % (ref 21–51)
MCH RBC QN AUTO: 34.2 PG (ref 26–34)
MCHC RBC AUTO-ENTMCNC: 34.6 G/DL (ref 31–37)
MCV RBC AUTO: 99 FL (ref 81–99)
MONOCYTES # BLD AUTO: 1.8 THOUSAND/ΜL (ref 0.17–1.22)
MONOCYTES NFR BLD AUTO: 13 % (ref 2–12)
NEUTROPHILS # BLD AUTO: 9.3 THOUSANDS/ΜL (ref 1.4–6.5)
NEUTS SEG NFR BLD AUTO: 66 % (ref 42–75)
PLATELET # BLD AUTO: 449 THOUSANDS/UL (ref 149–390)
PMV BLD AUTO: 7.9 FL (ref 8.6–11.7)
POTASSIUM SERPL-SCNC: 4.4 MMOL/L (ref 3.5–5.5)
PROCALCITONIN SERPL-MCNC: <0.05 NG/ML
RBC # BLD AUTO: 4.06 MILLION/UL (ref 4.3–5.9)
SODIUM SERPL-SCNC: 141 MMOL/L (ref 134–143)
WBC # BLD AUTO: 14.2 THOUSAND/UL (ref 4.8–10.8)

## 2020-08-07 PROCEDURE — 85025 COMPLETE CBC W/AUTO DIFF WBC: CPT | Performed by: NURSE PRACTITIONER

## 2020-08-07 PROCEDURE — 80048 BASIC METABOLIC PNL TOTAL CA: CPT | Performed by: NURSE PRACTITIONER

## 2020-08-07 PROCEDURE — 99232 SBSQ HOSP IP/OBS MODERATE 35: CPT | Performed by: PHYSICIAN ASSISTANT

## 2020-08-07 PROCEDURE — 97116 GAIT TRAINING THERAPY: CPT

## 2020-08-07 PROCEDURE — 78315 BONE IMAGING 3 PHASE: CPT

## 2020-08-07 PROCEDURE — A9503 TC99M MEDRONATE: HCPCS

## 2020-08-07 PROCEDURE — 97163 PT EVAL HIGH COMPLEX 45 MIN: CPT

## 2020-08-07 PROCEDURE — G1004 CDSM NDSC: HCPCS

## 2020-08-07 PROCEDURE — 97167 OT EVAL HIGH COMPLEX 60 MIN: CPT

## 2020-08-07 RX ADMIN — APIXABAN 5 MG: 5 TABLET, FILM COATED ORAL at 17:46

## 2020-08-07 RX ADMIN — CEFAZOLIN SODIUM 2000 MG: 2 SOLUTION INTRAVENOUS at 17:46

## 2020-08-07 RX ADMIN — CEFAZOLIN SODIUM 2000 MG: 2 SOLUTION INTRAVENOUS at 02:37

## 2020-08-07 RX ADMIN — DILTIAZEM HYDROCHLORIDE 300 MG: 180 CAPSULE, COATED, EXTENDED RELEASE ORAL at 09:01

## 2020-08-07 RX ADMIN — SPIRONOLACTONE 25 MG: 25 TABLET ORAL at 09:01

## 2020-08-07 RX ADMIN — CEFAZOLIN SODIUM 2000 MG: 2 SOLUTION INTRAVENOUS at 10:46

## 2020-08-07 RX ADMIN — APIXABAN 5 MG: 5 TABLET, FILM COATED ORAL at 09:01

## 2020-08-07 NOTE — PLAN OF CARE
Problem: PHYSICAL THERAPY ADULT  Goal: Performs mobility at highest level of function for planned discharge setting  See evaluation for individualized goals  Description: Treatment/Interventions: Functional transfer training, LE strengthening/ROM, Therapeutic exercise, Endurance training, Patient/family training, Equipment eval/education, Bed mobility, Gait training, Spoke to nursing, OT  Equipment Recommended: Walker(pt  has own)       See flowsheet documentation for full assessment, interventions and recommendations  Note: Prognosis: Good  Problem List: Decreased strength, Decreased range of motion, Decreased endurance, Impaired balance, Decreased mobility, Decreased coordination, Decreased safety awareness, Orthopedic restrictions, Decreased skin integrity  Assessment: Pt is 65 y o  male seen for PT evaluation s/p admit to [address] [place] [address] on 8/6/2020 w/ SIRS (systemic inflammatory response syndrome) [facility]  PT consulted to assess pt's functional mobility and d/c needs  Order placed for PT eval and tx, w/ up and OOB as tolerated and WBAT L LE order  Comorbidities affecting pt's physical performance at time of assessment include: SIRS, bimalleolar fx, HTN,thrombocytosis, generalized weakness   PTA, pt was independent w/ all functional mobility w/ AD and knee scooter usage  Personal factors affecting pt at time of IE include: stairs to enter home, inability to navigate community distances, inability to navigate level surfaces w/o external assistance, unable to perform dynamic tasks in community, positive fall history, limited insight into impairments and inability to perform IADLs   Please find objective findings from PT assessment regarding body systems outlined above with impairments and limitations including weakness, decreased ROM, impaired balance, decreased endurance, impaired coordination, gait deviations, decreased activity tolerance, decreased functional mobility tolerance, decreased safety awareness, impaired judgement, fall risk and orthopedic restrictions  The following objective measures performed on IE also reveal limitations: Barthel Index: 55/100  Pt's clinical presentation is currently unstable/unpredictable seen in pt's presentation of fall PTA, progression of symptoms prior to hospitalization,abnormal lab values  Pt to benefit from continued PT tx to address deficits as defined above and maximize level of functional independent mobility and consistency  From PT/mobility standpoint, recommendation at time of d/c would be Home PT with family support pending progress in order to facilitate return to PLOF  PT Discharge Recommendation: Home with skilled therapy, Return to previous environment with social support     PT - OK to Discharge: No    See flowsheet documentation for full assessment

## 2020-08-07 NOTE — OCCUPATIONAL THERAPY NOTE
Occupational Therapy Evaluation      Yolanda Mejia    8/7/2020    Patient Active Problem List   Diagnosis    Essential hypertension    Persistent atrial fibrillation (HCC)    Closed displaced bimalleolar fracture of left lower leg with routine healing    Thrombocytosis (HCC)    Leukocytosis    Weakness generalized    SIRS (systemic inflammatory response syndrome) (HCC)       Past Medical History:   Diagnosis Date    Atrial fibrillation ([address] )     Hypertension        Past Surgical History:   Procedure Laterality Date    CATARACT EXTRACTION, BILATERAL      HERNIA REPAIR      umbilical    ORIF TIBIA & FIBULA FRACTURES Left 5/26/2020    Procedure: OPEN REDUCTION W/ INTERNAL FIXATION (ORIF) ANKLE;  Surgeon: Suzanna San; Location: [address] [place] [address] OR;  Service: Orthopedics        08/07/20 0848   Note Type   Note type Eval only   Restrictions/Precautions   Weight Bearing Precautions Per Order Yes   LLE Weight Bearing Per Order WBAT   Braces or Orthoses CAM Boot  (LLE)   Other Precautions Chair Alarm;Multiple lines; Fall Risk;WBS; Impulsive   Pain Assessment   Pain Assessment Tool Pain Assessment not indicated - pt denies pain   Pain Score No Pain   Home Living   Type of Home House   Home Layout Two level;1/2 bath on main level;Bed/bath upstairs;Stairs to enter with rails  (4 VANESSA)   Bathroom Shower/Tub Walk-in shower   Bathroom Toilet Raised   Bathroom Equipment Grab bars in shower;Grab bars around toilet   Bathroom Accessibility Accessible   Home Equipment Walker;Cane;Other (Comment)  (Pt utilizes knee scooter and RW )   Prior Function   Level of [place] Independent with ADLs and functional mobility   Lives With Spouse   Receives Help From Family   ADL Assistance Independent   IADLs Needs assistance   Falls in the last 6 months 1 to 4  (Fall on 8/3/2020)   Vocational Retired   Lifestyle   Autonomy Patient reporting being independent with ADLs, ambulatory with RW or knee scooter and lives with wife in a [facility] story house with 4 VANESSA   Reciprocal Relationships supportive wife    ADL   Eating Assistance 6  Modified independent   Grooming Assistance 6  Modified Independent   UB Bathing Assistance 5  Supervision/Setup   LB Bathing Assistance 4  Minimal Assistance   UB Dressing Assistance 5  Supervision/Setup   LB Dressing Assistance 4  Minimal Assistance   Toileting Assistance  4  Minimal Assistance   Bed Mobility   Supine to Sit 5  Supervision   Additional items Assist x 1;HOB elevated; Bedrails; Increased time required   Additional Comments Pt OOB and seated in chair at end of eval with chair alarm activated    Transfers   Sit to Stand 5  Supervision   Additional items Assist x 1;Verbal cues; Impulsive   Stand to Sit 5  Supervision   Additional items Assist x 1; Armrests; Increased time required;Verbal cues   Functional Mobility   Functional Mobility 5  Supervision   Additional Comments Pt ambulated in room with no SOB or LOB noted    Additional items Rolling walker   Balance   Static Sitting Good   Dynamic Sitting Fair +   Static Standing Fair   Dynamic Standing Fair -   Activity Tolerance   Activity Tolerance Patient limited by fatigue   Nurse Made Aware EVA Curtis made aware of outcomes    RUE Assessment   RUE Assessment WFL   LUE Assessment   LUE Assessment WFL   Hand Function   Gross Motor Coordination Functional   Fine Motor Coordination Functional   Cognition   Overall Cognitive Status WFL   Arousal/Participation Alert; Responsive; Cooperative   Attention Within functional limits   Orientation Level Oriented X4   Memory Within functional limits   Following Commands Follows one step commands without difficulty   Comments Pt agreeable to OT eval    Assessment   Limitation Decreased ADL status; Decreased UE strength;Decreased Safe judgement during ADL;Decreased endurance;Decreased high-level ADLs; Decreased self-care trans   Prognosis Good   Assessment Pt is a 65 y o  male who was admitted to [address] [place] [address] on 8/6/2020 with SIRS (systemic inflammatory response syndrome) ([facility] )  At this time, patient is also affected by the comorbidities of: closed displaced bimalleolar fx of LLE, HTN, A-fib, thrombocytosis, and weakness  Additionally, patient is affected by the following personal factors:steps to enter environment, difficulty performing ADLS and difficulty performing IADLS   Orders placed for OT evaluation/treatment with up with assistance orders  Prior to admission, Patient reporting being independent with ADLs, ambulatory with RW or knee scooter and lives with wife in a two story house with 4 VANESSA  Upon OT evaluation, patient requires supervision/set-up for UB ADLs and minimum assist for LB ADLs  Occupational performance is affected by the following deficits: decreased strength, decreased balance, decreased tolerance, impulsivity, increased pain and orthopedic restrictions  Based on the following information, patient would benefit from continued skilled OT treatment while in the hospital to address deficits and maximize level of functional independence with ADL's and functional mobility  Occupational Performance areas to address include: grooming, bathing/shower, toilet hygiene, dressing, medication management, functional mobility, clothing management and meal prep  From OT standpoint, recommendation at time of d/c would be Home with skilled therapy  Goals   Patient Goals to get answers and go home    Plan   Treatment Interventions ADL retraining;Functional transfer training;UE strengthening/ROM; Endurance training;Patient/family training;Equipment evaluation/education; Compensatory technique education; Activityengagement   Goal Expiration Date 08/17/20   OT Treatment Day 0   OT Frequency 3-5x/wk   Recommendation   OT Discharge Recommendation Home with skilled therapy   OT - OK to Discharge Yes  (Once medically cleared )   Barthel Index   Feeding 10   Bathing 0   Grooming Score 5   Dressing Score 5   Bladder Score 10   Bowels Score 10   Toilet Use Score 5   Transfers (Bed/Chair) Score 10   Mobility (Level Surface) Score 0   Stairs Score 0   Barthel Index Score 55     GOALS:    *ADL transfers with (I) for inc'd independence with ADLs/purposeful tasks    *UB ADL with (I) for inc'd independence with self cares    *LB ADL with (I) using AE prn for inc'd independence with self cares    *Toileting with (I) for clothing management and hygiene for return to OF with personal care    *Increase stand tolerance x8 m for inc'd tolerance with standing purposeful tasks    *Participate in 10m UE therex to increase overall stamina/activity tolerance for purposeful tasks    *Bed mobility- (I) for inc'd independence to manage own comfort and initiate EOB & OOB purposeful tasks    *Patient will verbalize 3 safety awareness/ principles to prevent falls in the home setting  *Patient will verbalize and demonstrate use of energy conservation/deep breathing techniques and work simplification skills during functional activities with no verbal cues  *Patient will increase OOB/sitting tolerance to 2-4 hours per day to increase participation in self-care and leisure tasks with no s/s of exertion           Robert Silverio MS, OTR/L

## 2020-08-07 NOTE — QUICK NOTE
I dicussed the patient's case with Dr Patrice Fleming from infectious disease  If Bone scan is negative for osteomyelitis ID recommends treatment course of antibiotics on discharge of cephalexin or doxycyline if wound culture is positive for MRSA  If patient remains in the hospital over the weekend, ID will follow-up with patient on Monday

## 2020-08-07 NOTE — ASSESSMENT & PLAN NOTE
· Noted by tachycardia and leukocytosis on admission  · No sign of current infection  · Urinalysis negative, lactic acid 1 2  · Blood cultures pending  · Chest x-ray no acute disease  · Given 1 L IV fluids and cefepime 2 g in the ER  · Continue IV fluids and antibiotics for now  · Triple phase bone scan pending for today  · Wound culture pending, continue ancef for now pending culture results per Infectious Disease

## 2020-08-07 NOTE — PLAN OF CARE
Problem: Potential for Falls  Goal: Patient will remain free of falls  Description: INTERVENTIONS:  - Assess patient frequently for physical needs  -  Identify cognitive and physical deficits and behaviors that affect risk of falls    -  [place] fall precautions as indicated by assessment   - Educate patient/family on patient safety including physical limitations  - Instruct patient to call for assistance with activity based on assessment  - Modify environment to reduce risk of injury  - Consider OT/PT consult to assist with strengthening/mobility  Outcome: Progressing     Problem: Prexisting or High Potential for Compromised Skin Integrity  Goal: Skin integrity is maintained or improved  Description: INTERVENTIONS:  - Identify patients at risk for skin breakdown  - Assess and monitor skin integrity  - Assess and monitor nutrition and hydration status  - Monitor labs   - Assess for incontinence   - Turn and reposition patient  - Assist with mobility/ambulation  - Relieve pressure over bony prominences  - Avoid friction and shearing  - Provide appropriate hygiene as needed including keeping skin clean and dry  - Evaluate need for skin moisturizer/barrier cream  - Collaborate with interdisciplinary team   - Patient/family teaching  - Consider wound care consult   Outcome: Progressing     Problem: PAIN - ADULT  Goal: Verbalizes/displays adequate comfort level or baseline comfort level  Description: Interventions:  - Encourage patient to monitor pain and request assistance  - Assess pain using appropriate pain scale  - Administer analgesics based on type and severity of pain and evaluate response  - Implement non-pharmacological measures as appropriate and evaluate response  - Consider cultural and social influences on pain and pain management  - Notify physician/advanced practitioner if interventions unsuccessful or patient reports new pain  Outcome: Progressing     Problem: INFECTION - ADULT  Goal: Absence or prevention of progression during hospitalization  Description: INTERVENTIONS:  - Assess and monitor for signs and symptoms of infection  - Monitor lab/diagnostic results  - Monitor all insertion sites, i e  indwelling lines, tubes, and drains  - Monitor endotracheal if appropriate and nasal secretions for changes in amount and color  - [place] appropriate cooling/warming therapies per order  - Administer medications as ordered  - Instruct and encourage patient and family to use good hand hygiene technique  - Identify and instruct in appropriate isolation precautions for identified infection/condition  Outcome: Progressing  Goal: Absence of fever/infection during neutropenic period  Description: INTERVENTIONS:  - Monitor WBC    Outcome: Progressing     Problem: SAFETY ADULT  Goal: Maintain or return to baseline ADL function  Description: INTERVENTIONS:  -  Assess patient's ability to carry out ADLs; assess patient's baseline for ADL function and identify physical deficits which impact ability to perform ADLs (bathing, care of mouth/teeth, toileting, grooming, dressing, etc )  - Assess/evaluate cause of self-care deficits   - Assess range of motion  - Assess patient's mobility; develop plan if impaired  - Assess patient's need for assistive devices and provide as appropriate  - Encourage maximum independence but intervene and supervise when necessary  - Involve family in performance of ADLs  - Assess for home care needs following discharge   - Consider OT consult to assist with ADL evaluation and planning for discharge  - Provide patient education as appropriate  Outcome: Progressing  Goal: Maintain or return mobility status to optimal level  Description: INTERVENTIONS:  - Assess patient's baseline mobility status (ambulation, transfers, stairs, etc )    - Identify cognitive and physical deficits and behaviors that affect mobility  - Identify mobility aids required to assist with transfers and/or ambulation (gait belt, sit-to-stand, lift, walker, cane, etc )  - [place] fall precautions as indicated by assessment  - Record patient progress and toleration of activity level on Mobility SBAR; progress patient to next Phase/Stage  - Instruct patient to call for assistance with activity based on assessment  - Consider rehabilitation consult to assist with strengthening/weightbearing, etc   Outcome: Progressing     Problem: DISCHARGE PLANNING  Goal: Discharge to home or other facility with appropriate resources  Description: INTERVENTIONS:  - Identify barriers to discharge w/patient and caregiver  - Arrange for needed discharge resources and transportation as appropriate  - Identify discharge learning needs (meds, wound care, etc )  - Arrange for interpretive services to assist at discharge as needed  - Refer to Case Management Department for coordinating discharge planning if the patient needs post-hospital services based on physician/advanced practitioner order or complex needs related to functional status, cognitive ability, or social support system  Outcome: Progressing     Problem: Knowledge Deficit  Goal: Patient/family/caregiver demonstrates understanding of disease process, treatment plan, medications, and discharge instructions  Description: Complete learning assessment and assess knowledge base    Interventions:  - Provide teaching at level of understanding  - Provide teaching via preferred learning methods  Outcome: Progressing     Problem: METABOLIC, FLUID AND ELECTROLYTES - ADULT  Goal: Electrolytes maintained within normal limits  Description: INTERVENTIONS:  - Monitor labs and assess patient for signs and symptoms of electrolyte imbalances  - Administer electrolyte replacement as ordered  - Monitor response to electrolyte replacements, including repeat lab results as appropriate  - Instruct patient on fluid and nutrition as appropriate  Outcome: Progressing  Goal: Fluid balance maintained  Description: INTERVENTIONS:  - Monitor labs   - Monitor I/O and WT  - Instruct patient on fluid and nutrition as appropriate  - Assess for signs & symptoms of volume excess or deficit  Outcome: Progressing  Goal: Glucose maintained within target range  Description: INTERVENTIONS:  - Monitor Blood Glucose as ordered  - Assess for signs and symptoms of hyperglycemia and hypoglycemia  - Administer ordered medications to maintain glucose within target range  - Assess nutritional intake and initiate nutrition service referral as needed  Outcome: Progressing     Problem: SKIN/TISSUE INTEGRITY - ADULT  Goal: Skin integrity remains intact  Description: INTERVENTIONS  - Identify patients at risk for skin breakdown  - Assess and monitor skin integrity  - Assess and monitor nutrition and hydration status  - Monitor labs (i e  albumin)  - Assess for incontinence   - Turn and reposition patient  - Assist with mobility/ambulation  - Relieve pressure over bony prominences  - Avoid friction and shearing  - Provide appropriate hygiene as needed including keeping skin clean and dry  - Evaluate need for skin moisturizer/barrier cream  - Collaborate with interdisciplinary team (i e  Nutrition, Rehabilitation, etc )   - Patient/family teaching  Outcome: Progressing  Goal: Incision(s), wounds(s) or drain site(s) healing without S/S of infection  Description: INTERVENTIONS  - Assess and document risk factors for skin impairment   - Assess and document dressing, incision, wound bed, drain sites and surrounding tissue  - Consider nutrition services referral as needed  - Oral mucous membranes remain intact  - Provide patient/ family education  Outcome: Progressing  Goal: Oral mucous membranes remain intact  Description: INTERVENTIONS  - Assess oral mucosa and hygiene practices  - Implement preventative oral hygiene regimen  - Implement oral medicated treatments as ordered  - Initiate Nutrition services referral as needed  Outcome: Progressing Problem: MUSCULOSKELETAL - ADULT  Goal: Maintain or return mobility to safest level of function  Description: INTERVENTIONS:  - Assess patient's ability to carry out ADLs; assess patient's baseline for ADL function and identify physical deficits which impact ability to perform ADLs (bathing, care of mouth/teeth, toileting, grooming, dressing, etc )  - Assess/evaluate cause of self-care deficits   - Assess range of motion  - Assess patient's mobility  - Assess patient's need for assistive devices and provide as appropriate  - Encourage maximum independence but intervene and supervise when necessary  - Involve family in performance of ADLs  - Assess for home care needs following discharge   - Consider OT consult to assist with ADL evaluation and planning for discharge  - Provide patient education as appropriate  Outcome: Progressing  Goal: Maintain proper alignment of affected body part  Description: INTERVENTIONS:  - Support, maintain and protect limb and body alignment  - Provide patient/ family with appropriate education  Outcome: Progressing

## 2020-08-07 NOTE — PLAN OF CARE
Problem: OCCUPATIONAL THERAPY ADULT  Goal: Performs self-care activities at highest level of function for planned discharge setting  See evaluation for individualized goals  Description: Treatment Interventions: ADL retraining, Functional transfer training, UE strengthening/ROM, Endurance training, Patient/family training, Equipment evaluation/education, Compensatory technique education, Activityengagement          See flowsheet documentation for full assessment, interventions and recommendations  Note: Limitation: Decreased ADL status, Decreased UE strength, Decreased Safe judgement during ADL, Decreased endurance, Decreased high-level ADLs, Decreased self-care trans  Prognosis: Good  Assessment: Pt is a 65 y o  male who was admitted to [address] [place] [address] on 8/6/2020 with SIRS (systemic inflammatory response syndrome) ([facility] [address] )  At this time, patient is also affected by the comorbidities of: closed displaced bimalleolar fx of LLE, HTN, A-fib, thrombocytosis, and weakness  Additionally, patient is affected by the following personal factors:steps to enter environment, difficulty performing ADLS and difficulty performing IADLS   Orders placed for OT evaluation/treatment with up with assistance orders  Prior to admission, Patient reporting being independent with ADLs, ambulatory with RW or knee scooter and lives with wife in a two story house with 4 VANESSA  Upon OT evaluation, patient requires supervision/set-up for UB ADLs and minimum assist for LB ADLs  Occupational performance is affected by the following deficits: decreased strength, decreased balance, decreased tolerance, impulsivity, increased pain and orthopedic restrictions  Based on the following information, patient would benefit from continued skilled OT treatment while in the hospital to address deficits and maximize level of functional independence with ADL's and functional mobility   Occupational Performance areas to address include: grooming, bathing/shower, toilet hygiene, dressing, medication management, functional mobility, clothing management and meal prep  From OT standpoint, recommendation at time of d/c would be Home with skilled therapy       OT Discharge Recommendation: Home with skilled therapy  OT - OK to Discharge: Yes(Once medically cleared )     Hayley Blanc, OT

## 2020-08-07 NOTE — PLAN OF CARE
Problem: Potential for Falls  Goal: Patient will remain free of falls  Description: INTERVENTIONS:  - Assess patient frequently for physical needs  -  Identify cognitive and physical deficits and behaviors that affect risk of falls    -  [place] fall precautions as indicated by assessment   - Educate patient/family on patient safety including physical limitations  - Instruct patient to call for assistance with activity based on assessment  - Modify environment to reduce risk of injury  - Consider OT/PT consult to assist with strengthening/mobility  Outcome: Progressing     Problem: Prexisting or High Potential for Compromised Skin Integrity  Goal: Skin integrity is maintained or improved  Description: INTERVENTIONS:  - Identify patients at risk for skin breakdown  - Assess and monitor skin integrity  - Assess and monitor nutrition and hydration status  - Monitor labs   - Assess for incontinence   - Turn and reposition patient  - Assist with mobility/ambulation  - Relieve pressure over bony prominences  - Avoid friction and shearing  - Provide appropriate hygiene as needed including keeping skin clean and dry  - Evaluate need for skin moisturizer/barrier cream  - Collaborate with interdisciplinary team   - Patient/family teaching  - Consider wound care consult   Outcome: Progressing     Problem: PAIN - ADULT  Goal: Verbalizes/displays adequate comfort level or baseline comfort level  Description: Interventions:  - Encourage patient to monitor pain and request assistance  - Assess pain using appropriate pain scale  - Administer analgesics based on type and severity of pain and evaluate response  - Implement non-pharmacological measures as appropriate and evaluate response  - Consider cultural and social influences on pain and pain management  - Notify physician/advanced practitioner if interventions unsuccessful or patient reports new pain  Outcome: Progressing     Problem: INFECTION - ADULT  Goal: Absence or prevention of progression during hospitalization  Description: INTERVENTIONS:  - Assess and monitor for signs and symptoms of infection  - Monitor lab/diagnostic results  - Monitor all insertion sites, i e  indwelling lines, tubes, and drains  - Monitor endotracheal if appropriate and nasal secretions for changes in amount and color  - [place] appropriate cooling/warming therapies per order  - Administer medications as ordered  - Instruct and encourage patient and family to use good hand hygiene technique  - Identify and instruct in appropriate isolation precautions for identified infection/condition  Outcome: Progressing  Goal: Absence of fever/infection during neutropenic period  Description: INTERVENTIONS:  - Monitor WBC    Outcome: Progressing     Problem: SAFETY ADULT  Goal: Maintain or return to baseline ADL function  Description: INTERVENTIONS:  -  Assess patient's ability to carry out ADLs; assess patient's baseline for ADL function and identify physical deficits which impact ability to perform ADLs (bathing, care of mouth/teeth, toileting, grooming, dressing, etc )  - Assess/evaluate cause of self-care deficits   - Assess range of motion  - Assess patient's mobility; develop plan if impaired  - Assess patient's need for assistive devices and provide as appropriate  - Encourage maximum independence but intervene and supervise when necessary  - Involve family in performance of ADLs  - Assess for home care needs following discharge   - Consider OT consult to assist with ADL evaluation and planning for discharge  - Provide patient education as appropriate  Outcome: Progressing  Goal: Maintain or return mobility status to optimal level  Description: INTERVENTIONS:  - Assess patient's baseline mobility status (ambulation, transfers, stairs, etc )    - Identify cognitive and physical deficits and behaviors that affect mobility  - Identify mobility aids required to assist with transfers and/or ambulation (gait belt, sit-to-stand, lift, walker, cane, etc )  - [place] fall precautions as indicated by assessment  - Record patient progress and toleration of activity level on Mobility SBAR; progress patient to next Phase/Stage  - Instruct patient to call for assistance with activity based on assessment  - Consider rehabilitation consult to assist with strengthening/weightbearing, etc   Outcome: Progressing     Problem: DISCHARGE PLANNING  Goal: Discharge to home or other facility with appropriate resources  Description: INTERVENTIONS:  - Identify barriers to discharge w/patient and caregiver  - Arrange for needed discharge resources and transportation as appropriate  - Identify discharge learning needs (meds, wound care, etc )  - Arrange for interpretive services to assist at discharge as needed  - Refer to Case Management Department for coordinating discharge planning if the patient needs post-hospital services based on physician/advanced practitioner order or complex needs related to functional status, cognitive ability, or social support system  Outcome: Progressing     Problem: Knowledge Deficit  Goal: Patient/family/caregiver demonstrates understanding of disease process, treatment plan, medications, and discharge instructions  Description: Complete learning assessment and assess knowledge base    Interventions:  - Provide teaching at level of understanding  - Provide teaching via preferred learning methods  Outcome: Progressing     Problem: METABOLIC, FLUID AND ELECTROLYTES - ADULT  Goal: Electrolytes maintained within normal limits  Description: INTERVENTIONS:  - Monitor labs and assess patient for signs and symptoms of electrolyte imbalances  - Administer electrolyte replacement as ordered  - Monitor response to electrolyte replacements, including repeat lab results as appropriate  - Instruct patient on fluid and nutrition as appropriate  Outcome: Progressing  Goal: Fluid balance maintained  Description: INTERVENTIONS:  - Monitor labs   - Monitor I/O and WT  - Instruct patient on fluid and nutrition as appropriate  - Assess for signs & symptoms of volume excess or deficit  Outcome: Progressing  Goal: Glucose maintained within target range  Description: INTERVENTIONS:  - Monitor Blood Glucose as ordered  - Assess for signs and symptoms of hyperglycemia and hypoglycemia  - Administer ordered medications to maintain glucose within target range  - Assess nutritional intake and initiate nutrition service referral as needed  Outcome: Progressing     Problem: SKIN/TISSUE INTEGRITY - ADULT  Goal: Skin integrity remains intact  Description: INTERVENTIONS  - Identify patients at risk for skin breakdown  - Assess and monitor skin integrity  - Assess and monitor nutrition and hydration status  - Monitor labs (i e  albumin)  - Assess for incontinence   - Turn and reposition patient  - Assist with mobility/ambulation  - Relieve pressure over bony prominences  - Avoid friction and shearing  - Provide appropriate hygiene as needed including keeping skin clean and dry  - Evaluate need for skin moisturizer/barrier cream  - Collaborate with interdisciplinary team (i e  Nutrition, Rehabilitation, etc )   - Patient/family teaching  Outcome: Progressing  Goal: Incision(s), wounds(s) or drain site(s) healing without S/S of infection  Description: INTERVENTIONS  - Assess and document risk factors for skin impairment   - Assess and document dressing, incision, wound bed, drain sites and surrounding tissue  - Consider nutrition services referral as needed  - Oral mucous membranes remain intact  - Provide patient/ family education  Outcome: Progressing  Goal: Oral mucous membranes remain intact  Description: INTERVENTIONS  - Assess oral mucosa and hygiene practices  - Implement preventative oral hygiene regimen  - Implement oral medicated treatments as ordered  - Initiate Nutrition services referral as needed  Outcome: Progressing Problem: MUSCULOSKELETAL - ADULT  Goal: Maintain or return mobility to safest level of function  Description: INTERVENTIONS:  - Assess patient's ability to carry out ADLs; assess patient's baseline for ADL function and identify physical deficits which impact ability to perform ADLs (bathing, care of mouth/teeth, toileting, grooming, dressing, etc )  - Assess/evaluate cause of self-care deficits   - Assess range of motion  - Assess patient's mobility  - Assess patient's need for assistive devices and provide as appropriate  - Encourage maximum independence but intervene and supervise when necessary  - Involve family in performance of ADLs  - Assess for home care needs following discharge   - Consider OT consult to assist with ADL evaluation and planning for discharge  - Provide patient education as appropriate  Outcome: Progressing  Goal: Maintain proper alignment of affected body part  Description: INTERVENTIONS:  - Support, maintain and protect limb and body alignment  - Provide patient/ family with appropriate education  Outcome: Progressing

## 2020-08-07 NOTE — PLAN OF CARE
Problem: PHYSICAL THERAPY ADULT  Goal: Performs mobility at highest level of function for planned discharge setting  See evaluation for individualized goals  Outcome: Progressing  Note: Prognosis: Good  Problem List: Decreased strength, Decreased range of motion, Decreased endurance, Impaired balance, Decreased mobility, Decreased coordination, Decreased safety awareness, Decreased skin integrity, Orthopedic restrictions  Assessment: Pt seen for PT treatment session this date with interventions consisting of gait training w/ emphasis on improving pt's ability to ambulate level surfaces x 200 feet, 125 feet & 75 feet x 1 with standing rest breaks between with close S provided by therapist with RW and stair training 5 steps with B rails, step to step pattern CG x 1 for safety  Pt agreeable to PT treatment session upon arrival, pt found supine in bed w/ HOB elevated, in no apparent distress  In comparison to previous session, pt with significant improvements in amb distances & stair training  Post session: pt returned BTB and all needs in reach Continue to recommend Home PT with family support at time of d/c in order to maximize pt's functional independence and safety w/ mobility  Pt continues to be functioning below baseline level, and remains limited 2* factors listed above and including decreased strength, endurance & safe functional mobility  PT will continue to see pt while here in order to address the deficits listed above and provide interventions consistent w/ POC in effort to achieve STGs  PT Discharge Recommendation: Home with skilled therapy, Return to previous environment with social support     PT - OK to Discharge: Yes(when med cleared)    See flowsheet documentation for full assessment

## 2020-08-07 NOTE — PROGRESS NOTES
Progress Note - Mary Weaver 1950, 65 y o  male MRN: [id number]    Unit/Bed#: -01 Encounter: [id number]    Primary Care Provider: Erika Hodges DO   Date and time admitted to hospital: 8/6/2020  3:32 AM        * SIRS (systemic inflammatory response syndrome) ([facility] )  Assessment & Plan  · Noted by tachycardia and leukocytosis on admission  · No sign of current infection  · Urinalysis negative, lactic acid 1 2  · Blood cultures pending  · Chest x-ray no acute disease  · Given 1 L IV fluids and cefepime 2 g in the ER  · Continue IV fluids and antibiotics for now  · Triple phase bone scan pending for today  · Wound culture pending, continue ancef for now pending culture results per Infectious Disease  Weakness generalized  Assessment & Plan  · PT/OT for discharge planning    Thrombocytosis ([facility] )  Assessment & Plan  · Suspect reactive    Closed displaced bimalleolar fracture of left lower leg with routine healing  Assessment & Plan  · Orthopedic consult appreciated  · X-ray of the ankle- read pending  · Triple phase bone scan planned for today  · Wound culture pending, continue ancef for now pending results per Infectious Disease  Persistent atrial fibrillation (HCC)  Assessment & Plan  · Continue Cardizem for rate control  · Continue Eliquis for anticoagulation    Essential hypertension  Assessment & Plan  · Continue Cardizem  · Monitor blood pressure      VTE Pharmacologic Prophylaxis:   Pharmacologic: Apixaban (Eliquis)  Mechanical VTE Prophylaxis in Place: Yes    Patient Centered Rounds: I have performed bedside rounds with nursing staff today  Discussions with Specialists or Other Care Team Provider: CM, nursing    Education and Discussions with Family / Patient: patient    Time Spent for Care: 20 minutes  More than 50% of total time spent on counseling and coordination of care as described above      Current Length of Stay: 0 day(s)    Current Patient Status: Inpatient Certification Statement: The patient, admitted on an observation basis, will now require > 2 midnight hospital stay due to continued need for IV antibiotics pending culture results per ID recommendations  Bone scan pending  Discharge Plan: TBD    Code Status: Level 1 - Full Code      Subjective: The patient was seen and examined  The patient states he is feeling better today compared to yesterday  He denies any pain  Objective:     Vitals:   Temp (24hrs), Av 6 °F (36 4 °C), Min:97 3 °F (36 3 °C), Max:98 °F (36 7 °C)    Temp:  [97 3 °F (36 3 °C)-98 °F (36 7 °C)] 97 5 °F (36 4 °C)  HR:  [72-84] 84  Resp:  [18] 18  BP: (107-143)/(58-83) 107/58  SpO2:  [95 %-97 %] 95 %  Body mass index is 27 49 kg/m²  Input and Output Summary (last 24 hours): Intake/Output Summary (Last 24 hours) at 2020 1034  Last data filed at 2020 0941  Gross per 24 hour   Intake --   Output 1850 ml   Net -1850 ml       Physical Exam:     Physical Exam  Vitals signs and nursing note reviewed  Constitutional:       Appearance: Normal appearance  Cardiovascular:      Rate and Rhythm: Normal rate and regular rhythm  Heart sounds: Normal heart sounds  Pulmonary:      Effort: Pulmonary effort is normal       Breath sounds: Normal breath sounds  No wheezing, rhonchi or rales  Abdominal:      General: Abdomen is flat  Bowel sounds are normal       Palpations: Abdomen is soft  Tenderness: There is no abdominal tenderness  Musculoskeletal:      Comments: Left foot with dressing and boot in place   Skin:     General: Skin is warm and dry  Neurological:      General: No focal deficit present  Mental Status: He is alert and oriented to person, place, and time  Cranial Nerves: Cranial nerves are intact  Psychiatric:         Behavior: Behavior is cooperative           Additional Data:     Labs:    Results from last 7 days   Lab Units 20  0433   WBC Thousand/uL 14 20*   HEMOGLOBIN g/dL 13 9* HEMATOCRIT % 40 1*   PLATELETS Thousands/uL 449*   NEUTROS PCT % 66   LYMPHS PCT % 16*   MONOS PCT % 13*   EOS PCT % 5     Results from last 7 days   Lab Units 08/07/20  0433 08/06/20  0430   SODIUM mmol/L 141 137   POTASSIUM mmol/L 4 4 4 3   CHLORIDE mmol/L 110* 106   CO2 mmol/L 26 22   BUN mg/dL 10 19   CREATININE mg/dL 0 88 0 95   ANION GAP mmol/L 5 9   CALCIUM mg/dL 8 7 9 2   ALBUMIN g/dL  --  3 7   TOTAL BILIRUBIN mg/dL  --  1 20*   ALK PHOS U/L  --  76   ALT U/L  --  23   AST U/L  --  30   GLUCOSE RANDOM mg/dL 85 104*                 Results from last 7 days   Lab Units 08/06/20  1814 08/06/20  0952 08/06/20  0430   LACTIC ACID mmol/L  --   --  1 2   PROCALCITONIN ng/ml <0 05 <0 05  --            * I Have Reviewed All Lab Data Listed Above  * Additional Pertinent Lab Tests Reviewed: All Labs Within Last 24 Hours Reviewed    Imaging:    Imaging Reports Reviewed Today Include: none  Imaging Personally Reviewed by Myself Includes:  none    Recent Cultures (last 7 days):     Results from last 7 days   Lab Units 08/06/20  1215 08/06/20  0430   BLOOD CULTURE   --  No Growth at 24 hrs  No Growth at 24 hrs  GRAM STAIN RESULT  No Polys or Bacteria seen  --        Last 24 Hours Medication List:   acetaminophen, 650 mg, Oral, Q6H PRN, BETH Melgar  apixaban, 5 mg, Oral, BID, BETH Melgar  cefazolin, 2,000 mg, Intravenous, Q8H, Zakia Pastor PA-C, Last Rate: 2,000 mg (08/07/20 0237)  diltiazem, 300 mg, Oral, Daily, BETH Melgar  ondansetron, 4 mg, Intravenous, Q6H PRN, BETH Melgar  spironolactone, 25 mg, Oral, Daily, BETH Melgar         Today, Patient Was Seen By: Sven Vergara PA-C    ** Please Note: Dictation voice to text software may have been used in the creation of this document   **

## 2020-08-07 NOTE — PHYSICAL THERAPY NOTE
Physical Therapy Evaluation & Treatment    Patient's Name: Hoang Russell    Admitting Diagnosis  Dehydration [E86 0]  Weakness generalized [R53 1]  Tachycardia [R00 0]  Generalized weakness [R53 1]    Problem List  Patient Active Problem List   Diagnosis    Essential hypertension    Persistent atrial fibrillation (HCC)    Closed displaced bimalleolar fracture of left lower leg with routine healing    Thrombocytosis (HCC)    Leukocytosis    Weakness generalized    SIRS (systemic inflammatory response syndrome) ([facility] )       Past Medical History  Past Medical History:   Diagnosis Date    Atrial fibrillation ([facility] )     Hypertension        Past Surgical History  Past Surgical History:   Procedure Laterality Date    CATARACT EXTRACTION, BILATERAL      HERNIA REPAIR      umbilical    ORIF TIBIA & FIBULA FRACTURES Left 5/26/2020    Procedure: OPEN REDUCTION W/ INTERNAL FIXATION (ORIF) ANKLE;  Surgeon: Tanesha Kohli;   Location: [address] [place] [address];  Service: Orthopedics        08/07/20 0816   Note Type   Note type Eval/Treat   Pain Assessment   Pain Assessment Tool Pain Assessment not indicated - pt denies pain   Home Living   Type of [address] Two level;1/2 bath on main level;Bed/bath upstairs;Stairs to enter with rails  (4 VANESSA, reports no issue PTA)   Bathroom Shower/Tub Walk-in shower   Bathroom Toilet Raised   Bathroom Equipment Grab bars in shower;Grab bars around toilet   [address] Walker;Cane;Other (Comment)  (knee scooter)   Prior Function   Level of [place] Independent with ADLs and functional mobility   Lives With Spouse   Receives Help From Family   ADL Assistance Independent   IADLs Needs assistance   Falls in the last 6 months 1 to 4  (on 08/03/2020)   Vocational Retired   Restrictions/Precautions   [place] Bearing Precautions Per Order Yes   LLE [place] Bearing Per Order WBAT   Braces or Orthoses CAM Boot  (LLE)   Other Precautions Chair Alarm;Multiple lines; Fall Risk;WBS; Impulsive   General   Family/Caregiver Present No   Cognition   Overall Cognitive Status WFL   Arousal/Participation Alert   Orientation Level Oriented X4   Memory Within functional limits   Following Commands Follows one step commands with increased time or repetition   Comments Pt agreeable to PT assessment  RUE Assessment   RUE Assessment WFL   LUE Assessment   LUE Assessment WFL   RLE Assessment   RLE Assessment X  (4-/5 gross musculature)   LLE Assessment   LLE Assessment X  (3+/5 gross musculature)   LLE Overall AROM   L Ankle Dorsiflexion decreased    L Ankle Plantar Flexion decreased   Coordination   Movements are Fluid and Coordinated 0   Bed Mobility   Supine to Sit 5  Supervision   Additional items Assist x 1;HOB elevated; Bedrails; Increased time required;Verbal cues   Sit to Supine   (DNT as pt  was sitting OOB on chair upon conclusion )   Transfers   Sit to Stand 5  Supervision   Additional items Assist x 1;Bedrails; Increased time required;Verbal cues   Stand to Sit 5  Supervision   Additional items Assist x 1; Armrests; Increased time required;Verbal cues   Stand pivot 5  Supervision   Additional items Assist x 1; Increased time required;Verbal cues   Ambulation/Elevation   Gait pattern Improper Weight shift;Decreased L stance;Decreased foot clearance; Short stride   Gait Assistance 5  Supervision   Additional items Assist x 1;Verbal cues; Tactile cues   Assistive Device Rolling walker   Distance 35 feet x 2   Stair Management Assistance Not tested   Balance   Static Sitting Good   Dynamic Sitting Fair +   Static Standing Fair   Dynamic Standing Fair -   Ambulatory Fair -   Endurance Deficit   Endurance Deficit Yes   Activity Tolerance   Activity Tolerance Patient limited by fatigue   Nurse Made Aware yes, Roberta RN   Assessment   Prognosis Good   Problem List Decreased strength;Decreased range of motion;Decreased endurance; Impaired balance;Decreased mobility; Decreased coordination;Decreased safety awareness;Orthopedic restrictions;Decreased skin integrity   Assessment Pt is 65 y o  male seen for PT evaluation s/p admit to [address] [place] [address] on 8/6/2020 w/ SIRS (systemic inflammatory response syndrome) ([facility] )  PT consulted to assess pt's functional mobility and d/c needs  Order placed for PT eval and tx, w/ up and OOB as tolerated and WBAT L LE order  Comorbidities affecting pt's physical performance at time of assessment include: SIRS, bimalleolar fx, HTN,thrombocytosis, generalized weakness   PTA, pt was independent w/ all functional mobility w/ AD and knee scooter usage  Personal factors affecting pt at time of IE include: stairs to enter home, inability to navigate community distances, inability to navigate level surfaces w/o external assistance, unable to perform dynamic tasks in community, positive fall history, limited insight into impairments and inability to perform IADLs  Please find objective findings from PT assessment regarding body systems outlined above with impairments and limitations including weakness, decreased ROM, impaired balance, decreased endurance, impaired coordination, gait deviations, decreased activity tolerance, decreased functional mobility tolerance, decreased safety awareness, impaired judgement, fall risk and orthopedic restrictions  The following objective measures performed on IE also reveal limitations: Barthel Index: 55/100  Pt's clinical presentation is currently unstable/unpredictable seen in pt's presentation of fall PTA, progression of symptoms prior to hospitalization,abnormal lab values  Pt to benefit from continued PT tx to address deficits as defined above and maximize level of functional independent mobility and consistency  From PT/mobility standpoint, recommendation at time of d/c would be Home PT with family support pending progress in order to facilitate return to PLOF     Goals   Patient Goals to know what's going on   LTG Expiration Date 08/17/20   Long Term Goal #1 1 )Patient will complete bed mobility independently for decrease need for caregiver assistance, decrease burden of care  2 )Patient will complete transfers modified I  to decrease risk of falls, facilitate upright standing posture  3 ) BLE strength to greater than/equal to 4/5 gross musculature to increase ability to safely transfer, control descent to chair  4 ) Patient will exhibit increase static standing balance to Good, for 3-5 minutes without LOB modified I to improve activity tolerance  5 ) Patient will exhibit increase dynamic ambulatory balance to Fair+ for 150-200 feet w/AD modified I  to improve ability to mobilize to toilet, chair and decrease risk for additional medical complications  6 ) Patient will exhibit good self monitoring and ability to follow 2 step commands to increase complexity of tasks and resume ADL's without LOB  7 )Ascend/descend 4 steps w/bilateral handrails supervision of 1 to access home environment  PT Treatment Day 1   Plan   Treatment/Interventions Functional transfer training;LE strengthening/ROM; Therapeutic exercise; Endurance training;Patient/family training;Equipment eval/education; Bed mobility;Gait training;Spoke to nursing;OT   PT Frequency Other (Comment)  (3-5x/wk)   Recommendation   PT Discharge Recommendation Home with skilled therapy; Return to previous environment with social support   Equipment Recommended Walker  (pt  has own)   PT - OK to Discharge No   Additional Comments Upon conclusion, pt  was sitting OOB on chair w/chair alarm engaged and all needs within reach     Barthel Index   Feeding 10   Bathing 0   Grooming Score 5   Dressing Score 5   Bladder Score 10   Bowels Score 10   Toilet Use Score 5   Transfers (Bed/Chair) Score 10   Mobility (Level Surface) Score 0   Stairs Score 0   Barthel Index Score 55     Additional skilled interventions:Gait Training x 10 minutes    S: Patient agreeable to participate in gait training, provided an appropriately adjusted RW,A&Ox4  O:Patient ambulated 70 feet total  with RW, supervision of 1 with verbal cues on proper RW usage, maintaining position with directional changes, and safety awareness  Tactile cues were also provided to maintain safety throughout session, due to intermittent balance perturbations with external tactile correcting  A:Patient responded well to provided education and demonstrated increased acuity with RW usage  P:Gait training will be continued with progressed distances as patient can safely tolerate      Komal Bryson, PT

## 2020-08-07 NOTE — PLAN OF CARE
Problem: Potential for Falls  Goal: Patient will remain free of falls  Description: INTERVENTIONS:  - Assess patient frequently for physical needs  -  Identify cognitive and physical deficits and behaviors that affect risk of falls    -  [place] fall precautions as indicated by assessment   - Educate patient/family on patient safety including physical limitations  - Instruct patient to call for assistance with activity based on assessment  - Modify environment to reduce risk of injury  - Consider OT/PT consult to assist with strengthening/mobility  Outcome: Progressing     Problem: Prexisting or High Potential for Compromised Skin Integrity  Goal: Skin integrity is maintained or improved  Description: INTERVENTIONS:  - Identify patients at risk for skin breakdown  - Assess and monitor skin integrity  - Assess and monitor nutrition and hydration status  - Monitor labs   - Assess for incontinence   - Turn and reposition patient  - Assist with mobility/ambulation  - Relieve pressure over bony prominences  - Avoid friction and shearing  - Provide appropriate hygiene as needed including keeping skin clean and dry  - Evaluate need for skin moisturizer/barrier cream  - Collaborate with interdisciplinary team   - Patient/family teaching  - Consider wound care consult   Outcome: Progressing     Problem: PAIN - ADULT  Goal: Verbalizes/displays adequate comfort level or baseline comfort level  Description: Interventions:  - Encourage patient to monitor pain and request assistance  - Assess pain using appropriate pain scale  - Administer analgesics based on type and severity of pain and evaluate response  - Implement non-pharmacological measures as appropriate and evaluate response  - Consider cultural and social influences on pain and pain management  - Notify physician/advanced practitioner if interventions unsuccessful or patient reports new pain  Outcome: Progressing     Problem: INFECTION - ADULT  Goal: Absence or prevention of progression during hospitalization  Description: INTERVENTIONS:  - Assess and monitor for signs and symptoms of infection  - Monitor lab/diagnostic results  - Monitor all insertion sites, i e  indwelling lines, tubes, and drains  - Monitor endotracheal if appropriate and nasal secretions for changes in amount and color  - [place] appropriate cooling/warming therapies per order  - Administer medications as ordered  - Instruct and encourage patient and family to use good hand hygiene technique  - Identify and instruct in appropriate isolation precautions for identified infection/condition  Outcome: Progressing  Goal: Absence of fever/infection during neutropenic period  Description: INTERVENTIONS:  - Monitor WBC    Outcome: Progressing     Problem: SAFETY ADULT  Goal: Maintain or return to baseline ADL function  Description: INTERVENTIONS:  -  Assess patient's ability to carry out ADLs; assess patient's baseline for ADL function and identify physical deficits which impact ability to perform ADLs (bathing, care of mouth/teeth, toileting, grooming, dressing, etc )  - Assess/evaluate cause of self-care deficits   - Assess range of motion  - Assess patient's mobility; develop plan if impaired  - Assess patient's need for assistive devices and provide as appropriate  - Encourage maximum independence but intervene and supervise when necessary  - Involve family in performance of ADLs  - Assess for home care needs following discharge   - Consider OT consult to assist with ADL evaluation and planning for discharge  - Provide patient education as appropriate  Outcome: Progressing  Goal: Maintain or return mobility status to optimal level  Description: INTERVENTIONS:  - Assess patient's baseline mobility status (ambulation, transfers, stairs, etc )    - Identify cognitive and physical deficits and behaviors that affect mobility  - Identify mobility aids required to assist with transfers and/or ambulation (gait belt, sit-to-stand, lift, walker, cane, etc )  - [place] fall precautions as indicated by assessment  - Record patient progress and toleration of activity level on Mobility SBAR; progress patient to next Phase/Stage  - Instruct patient to call for assistance with activity based on assessment  - Consider rehabilitation consult to assist with strengthening/weightbearing, etc   Outcome: Progressing     Problem: DISCHARGE PLANNING  Goal: Discharge to home or other facility with appropriate resources  Description: INTERVENTIONS:  - Identify barriers to discharge w/patient and caregiver  - Arrange for needed discharge resources and transportation as appropriate  - Identify discharge learning needs (meds, wound care, etc )  - Arrange for interpretive services to assist at discharge as needed  - Refer to Case Management Department for coordinating discharge planning if the patient needs post-hospital services based on physician/advanced practitioner order or complex needs related to functional status, cognitive ability, or social support system  Outcome: Progressing     Problem: Knowledge Deficit  Goal: Patient/family/caregiver demonstrates understanding of disease process, treatment plan, medications, and discharge instructions  Description: Complete learning assessment and assess knowledge base    Interventions:  - Provide teaching at level of understanding  - Provide teaching via preferred learning methods  Outcome: Progressing     Problem: METABOLIC, FLUID AND ELECTROLYTES - ADULT  Goal: Electrolytes maintained within normal limits  Description: INTERVENTIONS:  - Monitor labs and assess patient for signs and symptoms of electrolyte imbalances  - Administer electrolyte replacement as ordered  - Monitor response to electrolyte replacements, including repeat lab results as appropriate  - Instruct patient on fluid and nutrition as appropriate  Outcome: Progressing  Goal: Fluid balance maintained  Description: INTERVENTIONS:  - Monitor labs   - Monitor I/O and WT  - Instruct patient on fluid and nutrition as appropriate  - Assess for signs & symptoms of volume excess or deficit  Outcome: Progressing  Goal: Glucose maintained within target range  Description: INTERVENTIONS:  - Monitor Blood Glucose as ordered  - Assess for signs and symptoms of hyperglycemia and hypoglycemia  - Administer ordered medications to maintain glucose within target range  - Assess nutritional intake and initiate nutrition service referral as needed  Outcome: Progressing     Problem: SKIN/TISSUE INTEGRITY - ADULT  Goal: Skin integrity remains intact  Description: INTERVENTIONS  - Identify patients at risk for skin breakdown  - Assess and monitor skin integrity  - Assess and monitor nutrition and hydration status  - Monitor labs (i e  albumin)  - Assess for incontinence   - Turn and reposition patient  - Assist with mobility/ambulation  - Relieve pressure over bony prominences  - Avoid friction and shearing  - Provide appropriate hygiene as needed including keeping skin clean and dry  - Evaluate need for skin moisturizer/barrier cream  - Collaborate with interdisciplinary team (i e  Nutrition, Rehabilitation, etc )   - Patient/family teaching  Outcome: Progressing  Goal: Incision(s), wounds(s) or drain site(s) healing without S/S of infection  Description: INTERVENTIONS  - Assess and document risk factors for skin impairment   - Assess and document dressing, incision, wound bed, drain sites and surrounding tissue  - Consider nutrition services referral as needed  - Oral mucous membranes remain intact  - Provide patient/ family education  Outcome: Progressing  Goal: Oral mucous membranes remain intact  Description: INTERVENTIONS  - Assess oral mucosa and hygiene practices  - Implement preventative oral hygiene regimen  - Implement oral medicated treatments as ordered  - Initiate Nutrition services referral as needed  Outcome: Progressing Problem: MUSCULOSKELETAL - ADULT  Goal: Maintain or return mobility to safest level of function  Description: INTERVENTIONS:  - Assess patient's ability to carry out ADLs; assess patient's baseline for ADL function and identify physical deficits which impact ability to perform ADLs (bathing, care of mouth/teeth, toileting, grooming, dressing, etc )  - Assess/evaluate cause of self-care deficits   - Assess range of motion  - Assess patient's mobility  - Assess patient's need for assistive devices and provide as appropriate  - Encourage maximum independence but intervene and supervise when necessary  - Involve family in performance of ADLs  - Assess for home care needs following discharge   - Consider OT consult to assist with ADL evaluation and planning for discharge  - Provide patient education as appropriate  Outcome: Progressing  Goal: Maintain proper alignment of affected body part  Description: INTERVENTIONS:  - Support, maintain and protect limb and body alignment  - Provide patient/ family with appropriate education  Outcome: Progressing

## 2020-08-07 NOTE — PHYSICAL THERAPY NOTE
08/07/20 1304   Pain Assessment   Pain Assessment Tool 0-10   Pain Score No Pain   Restrictions/Precautions   Weight Bearing Precautions Per Order Yes   LLE Weight Bearing Per Order WBAT   Braces or Orthoses CAM Boot  (LLE)   Other Precautions Chair Alarm;WBS;Fall Risk;Impulsive   General   Family/Caregiver Present No   Cognition   Overall Cognitive Status WFL   Arousal/Participation Alert; Cooperative   Attention Within functional limits   Orientation Level Oriented X4   Memory Within functional limits   Following Commands Follows one step commands without difficulty   Comments pt agreed to PT session   Subjective   Subjective "i'll walk with you  I have a kneeler at home I use "   Bed Mobility   Supine to Sit 5  Supervision   Additional items Assist x 1;Bedrails; Increased time required;HOB elevated;Verbal cues   Sit to Supine 5  Supervision   Additional items Assist x 1; Increased time required;Verbal cues   Transfers   Sit to Stand 5  Supervision   Additional items Assist x 1; Impulsive;Verbal cues   Stand to Sit 5  Supervision   Additional items Assist x 1; Increased time required;Verbal cues   Ambulation/Elevation   Gait pattern Improper Weight shift;Decreased foot clearance;Decreased L stance; Short stride   Gait Assistance 5  Supervision   Additional items Assist x 1;Verbal cues; Tactile cues   Assistive Device Rolling walker   Distance 200 feet x 1, 125 feet x 1 & 75 feet x 1 with standing rest breaks between   Stair Management Assistance   (CG for safety)   Additional items Assist x 1;Verbal cues   Stair Management Technique Two rails; Step to pattern; Foreward   Number of Stairs 5  (pt deferred further stair training)   Balance   Static Sitting Good   Dynamic Sitting Fair +   Static Standing Fair   Dynamic Standing Fair -   Ambulatory Fair -   Endurance Deficit   Endurance Deficit Yes   Activity Tolerance   Activity Tolerance Patient tolerated treatment well   Assessment   Prognosis Good   Problem List Decreased strength;Decreased range of motion;Decreased endurance; Impaired balance;Decreased mobility; Decreased coordination;Decreased safety awareness;Decreased skin integrity;Orthopedic restrictions   Assessment Pt seen for PT treatment session this date with interventions consisting of gait training w/ emphasis on improving pt's ability to ambulate level surfaces x 200 feet, 125 feet & 75 feet x 1 with standing rest breaks between with close S provided by therapist with RW and stair training 5 steps with B rails, step to step pattern CG x 1 for safety  Pt agreeable to PT treatment session upon arrival, pt found supine in bed w/ HOB elevated, in no apparent distress  In comparison to previous session, pt with significant improvements in amb distances & stair training  Post session: pt returned BTB and all needs in reach Continue to recommend Home PT with family support at time of d/c in order to maximize pt's functional independence and safety w/ mobility  Pt continues to be functioning below baseline level, and remains limited 2* factors listed above and including decreased strength, endurance & safe functional mobility  PT will continue to see pt while here in order to address the deficits listed above and provide interventions consistent w/ POC in effort to achieve STGs  Goals   Patient Goals to check my wound on my leg   PT Treatment Day 2   Plan   Treatment/Interventions Functional transfer training;LE strengthening/ROM; Therapeutic exercise; Endurance training;Patient/family training;Equipment eval/education; Bed mobility;Gait training   Progress Progressing toward goals   PT Frequency   (3-5 x week)   Recommendation   PT Discharge Recommendation Home with skilled therapy; Return to previous environment with social support   Equipment Recommended Chel Socks   PT - OK to Discharge Yes  (when med cleared)   Birtha Rafia, PTA

## 2020-08-07 NOTE — NURSING NOTE
Pt resting at edge of bed  Due for bone scan at noon  Denies pain, denies shortness of breath  Alert, oriented x4  States, "I feel a lot stronger than when I came in " HRR, +1 NP edema to BLE  Incision to LLE with scant yellow drainage, beefy red wound bed  Dressing changed as ordered, walking splint applied when OOB  t continues on ANCEF as ordered  Personal needs and call bell within reach, will continue to monitor

## 2020-08-07 NOTE — SOCIAL WORK
Pt not yet stable for discharge per SLIM  HHC recommended by therapy  CM met with pt at bedside to discuss same  Pt reports she does not want HHC at this time  He is aware his PCP can order same if needed after discharge  Spouse will transport home  CM to follow

## 2020-08-07 NOTE — ASSESSMENT & PLAN NOTE
· Orthopedic consult appreciated  · X-ray of the ankle- read pending  · Triple phase bone scan planned for today  · Wound culture pending, continue ancef for now pending results per Infectious Disease

## 2020-08-07 NOTE — UTILIZATION REVIEW
Initial Clinical Review    PLEASE NOTE:   Patient Upgraded to IP 8/7 @ 9850 1142  From OBS 8/6 @ [phone]  Due to continued need for IV antibiotics pending culture results per ID recommendations  Bone scan pending  Start    Ordered    08/07/20 1034    Inpatient Admission  Once      Transfer Service: Hospitalist       Question  Answer  Comment    Admitting Physician  Karlo MURDOCK     Level of Care  Med Surg     Estimated length of stay  More than 2 Midnights     Certification  I certify that inpatient services are medically necessary for this patient for a duration of greater than two midnights  See H&P and MD Progress Notes for additional information about the patient's course of treatment  08/07/20 1034     ED Arrival Information     Expected Arrival Acuity Means of Arrival Escorted By Service Admission Type    - 8/6/2020 03:26 Urgent Ambulance [facility] Urgent    Arrival Complaint    weakness        Chief Complaint   Patient presents with    Weakness - Generalized     worsening past 5 days  pt had left ankle fx repair at this facility 6/6/2020 and has been improving since but recently, last five days, slow decline "I'm just wiped out"     Assessment/Plan: 64 yo male presents to ED from home with feeling lethargic & weak x 5 days  PMH HTN, Afib on anticoagulation,  Left malleolar fracture repaired June 6, 2020 with a dehiscence of the wound & followed by Ortho as OP  Prescribed 7 days course Keflex on 7/28 with wet to dry dsgs  He had a fall 5 days ago with no head injury or loss of consciousness  He has had no fevers, chills  Still has open wound w/ scant yellow drainage  On exam: afebrile, tachycardic, WBC's 22 90, Plats 563, CRP 21 2,  Procalcitonin negative  Admitted to Observation with SIRS with tachycardia/leukocytosis  Plan: IVF's, IV Cefazolin, f/u on BC's, Bone Scan, ID Consult, Ortho Consult, PT / OT    Per Ortho:  3 months S/P ORIF left bimalleolar ankle fracture  Patient has had some wound dehiscence with chronic drainage  On examination today the wound shows significant improvement compared to prior examinations by myself  no active signs of infection around the ankle  Radiographs today show maintained alignment with what appears to be interval healing of the medial mal  WBAT LLE in CAM boot  I will defer to Infectious Disease recommendation for suppressive antibiotics for the next several weeks  Per ID: Pt does not appear septic or toxic presently  Check MRSA nares screen, f/u on cx's  Continue cefazolin  8/7 Upgraded to IP  Continue with Cefazolin pending wound cx    Triple phase Bone Scan pending    ED Triage Vitals   Temperature Pulse Respirations Blood Pressure SpO2   08/06/20 0736 08/06/20 0332 08/06/20 0332 08/06/20 0332 08/06/20 0332   98 4 °F (36 9 °C) (!) 117 18 127/94 98 %      Temp Source Heart Rate Source Patient Position - Orthostatic VS BP Location FiO2 (%)   08/06/20 0736 08/06/20 0530 08/06/20 0736 08/06/20 0530 --   Temporal Monitor Lying Left arm       Pain Score       08/06/20 0332       2          Wt Readings from Last 1 Encounters:   08/07/20 86 9 kg (191 lb 9 3 oz)     Additional Vital Signs:   08/07/20 0713   97 5 °F (36 4 °C)   84   18   107/58   --   95 %   None (Room air)  Lying    08/06/20 2325   97 3 °F (36 3 °C)     72   18   143/83   --   95 %   None (Room air)  Lying    08/06/20 1530   98 °F (36 7 °C)   72   18   112/63   80   97 %   None (Room air)  Lying    08/06/20 0736   98 4 °F (36 9 °C)   114Abnormal     18   127/60   --   97 %   None (Room air)  Lying    08/06/20 0700   --   97   --   140/73   97   95 %   --  --    08/06/20 0600   --   107Abnormal     --   141/81   102   97 %   --  --    08/06/20 0530   --   108Abnormal     --   136/76   101   97 %   None (Room air)         Pertinent Labs/Diagnostic Test Results:   Results from last 7 days   Lab Units 08/07/20  0433 08/06/20  0430   WBC Thousand/uL 14 20* 22 90*   HEMOGLOBIN g/dL 13 9* 15 0   HEMATOCRIT % 40 1* 43 9   PLATELETS Thousands/uL 449* 563*   NEUTROS ABS Thousands/µL 9 30*  --    TOTAL NEUT ABS Thousand/uL  --  20 15*     Results from last 7 days   Lab Units 08/07/20  0433 08/06/20  0430   SODIUM mmol/L 141 137   POTASSIUM mmol/L 4 4 4 3   CHLORIDE mmol/L 110* 106   CO2 mmol/L 26 22   ANION GAP mmol/L 5 9   BUN mg/dL 10 19   CREATININE mg/dL 0 88 0 95   EGFR ml/min/1 73sq m 86 80   CALCIUM mg/dL 8 7 9 2     Results from last 7 days   Lab Units 08/06/20  0430   AST U/L 30   ALT U/L 23   ALK PHOS U/L 76   TOTAL PROTEIN g/dL 6 7   ALBUMIN g/dL 3 7   TOTAL BILIRUBIN mg/dL 1 20*     Results from last 7 days   Lab Units 08/07/20  0433 08/06/20  0430   GLUCOSE RANDOM mg/dL 85 104*     Results from last 7 days   Lab Units 08/06/20  0430   TROPONIN I ng/mL <0 03     Results from last 7 days   Lab Units 08/06/20  0547   D-DIMER QUANTITATIVE ug/ml FEU 0 39     Results from last 7 days   Lab Units 08/06/20  1814 08/06/20  0952   PROCALCITONIN ng/ml <0 05 <0 05     Results from last 7 days   Lab Units 08/06/20  0430   LACTIC ACID mmol/L 1 2     Results from last 7 days   Lab Units 08/06/20  0952   CRP mg/L 21 2*   SED RATE mm/hour 11     Results from last 7 days   Lab Units 08/06/20  0430   CLARITY UA  Clear   COLOR UA  Yellow   SPEC GRAV UA  1 020   PH UA  7 0   GLUCOSE UA mg/dl Negative   KETONES UA mg/dl Trace*   BLOOD UA  Negative   PROTEIN UA mg/dl Negative   NITRITE UA  Negative   BILIRUBIN UA  Negative   UROBILINOGEN UA E U /dl 2 0*   LEUKOCYTES UA  Negative     Results from last 7 days   Lab Units 08/06/20  1215 08/06/20  0430   BLOOD CULTURE   --  Received in Microbiology Lab  Culture in Progress  Received in Microbiology Lab  Culture in Progress  GRAM STAIN RESULT  No Polys or Bacteria seen  --      8/6 CXR: No acute cardiopulmonary disease  8/6 Left Ankle Xrays:   Minimal interval albeit incomplete fracture healing post bimalleolar ORIF  Hardware is intact   No evidence of loosening  No radiographic evidence of osteomyelitis  8/7 Bone Scan - in process    ED Treatment:   Medication Administration from 08/06/2020 0325 to 08/06/2020 1496       Date/Time Order Dose Route Action Action by Comments     08/06/2020 0433 sodium chloride 0 9 % bolus 1,000 mL 1,000 mL Intravenous New Bag Leonor New RN         Past Medical History:   Diagnosis Date    Atrial fibrillation (HCC)     Hypertension      Present on Admission:   Weakness generalized   Essential hypertension   Persistent atrial fibrillation (HCC)   Thrombocytosis (HCC)   SIRS (systemic inflammatory response syndrome) (HCC)      Admitting Diagnosis: Dehydration [E86 0]  Weakness generalized [R53 1]  Tachycardia [R00 0]  Generalized weakness [R53 1]  Age/Sex: 65 y o  male     Admission Orders:  Scheduled Medications:  apixaban, 5 mg, Oral, BID  cefazolin, 2,000 mg, Intravenous, Q8H  diltiazem, 300 mg, Oral, Daily  spironolactone, 25 mg, Oral, Daily    Continuous IV Infusions:  sodium chloride, 100 mL/hr, Intravenous, Continuous    PRN Meds:  acetaminophen, 650 mg, Oral, Q6H PRN  ondansetron, 4 mg, Intravenous, Q6H PRN    SCDs  IP CONSULT TO CASE MANAGEMENT  IP CONSULT TO ORTHOPEDIC SURGERY  IP CONSULT TO INFECTIOUS DISEASES    Network Utilization Review Department  [email or web address]  ATTENTION: Please call with any questions or concerns to [phone] and carefully listen to the prompts so that you are directed to the right person  All voicemails are confidential   [facility] all requests for admission clinical reviews, approved or denied determinations and any other requests to dedicated fax number below belonging to the campus where the patient is receiving treatment   List of dedicated fax numbers for the Facilities:  FACILITY NAME UR FAX NUMBER   ADMISSION DENIALS (Administrative/Medical Necessity) [phone]   [address] (Maternity/NICU/Pediatrics) [place] [address] [facility] [phone]   [facility]ene[facility] Russell [phone]   [address] [phone]   [facility]  [phone]   [address] [facility] [address]  [address] [phone]

## 2020-08-08 LAB
ANION GAP SERPL CALCULATED.3IONS-SCNC: 8 MMOL/L (ref 4–13)
BASOPHILS # BLD AUTO: 0.1 THOUSANDS/ΜL (ref 0–0.1)
BASOPHILS NFR BLD AUTO: 1 % (ref 0–2)
BUN SERPL-MCNC: 11 MG/DL (ref 7–25)
CALCIUM SERPL-MCNC: 8.7 MG/DL (ref 8.6–10.5)
CHLORIDE SERPL-SCNC: 108 MMOL/L (ref 98–107)
CO2 SERPL-SCNC: 23 MMOL/L (ref 21–31)
CREAT SERPL-MCNC: 0.77 MG/DL (ref 0.7–1.3)
EOSINOPHIL # BLD AUTO: 0.8 THOUSAND/ΜL (ref 0–0.61)
EOSINOPHIL NFR BLD AUTO: 7 % (ref 0–5)
ERYTHROCYTE [DISTWIDTH] IN BLOOD BY AUTOMATED COUNT: 14.2 % (ref 11.5–14.5)
GFR SERPL CREATININE-BSD FRML MDRD: 91 ML/MIN/1.73SQ M
GLUCOSE SERPL-MCNC: 87 MG/DL (ref 65–99)
HCT VFR BLD AUTO: 41 % (ref 42–47)
HGB BLD-MCNC: 14.3 G/DL (ref 14–18)
LYMPHOCYTES # BLD AUTO: 3 THOUSANDS/ΜL (ref 0.6–4.47)
LYMPHOCYTES NFR BLD AUTO: 24 % (ref 21–51)
MCH RBC QN AUTO: 34.1 PG (ref 26–34)
MCHC RBC AUTO-ENTMCNC: 34.7 G/DL (ref 31–37)
MCV RBC AUTO: 98 FL (ref 81–99)
MONOCYTES # BLD AUTO: 1.3 THOUSAND/ΜL (ref 0.17–1.22)
MONOCYTES NFR BLD AUTO: 11 % (ref 2–12)
MRSA NOSE QL CULT: NORMAL
NEUTROPHILS # BLD AUTO: 7.2 THOUSANDS/ΜL (ref 1.4–6.5)
NEUTS SEG NFR BLD AUTO: 58 % (ref 42–75)
PLATELET # BLD AUTO: 495 THOUSANDS/UL (ref 149–390)
PMV BLD AUTO: 8.2 FL (ref 8.6–11.7)
POTASSIUM SERPL-SCNC: 3.9 MMOL/L (ref 3.5–5.5)
RBC # BLD AUTO: 4.18 MILLION/UL (ref 4.3–5.9)
SODIUM SERPL-SCNC: 139 MMOL/L (ref 134–143)
WBC # BLD AUTO: 12.4 THOUSAND/UL (ref 4.8–10.8)

## 2020-08-08 PROCEDURE — 80048 BASIC METABOLIC PNL TOTAL CA: CPT | Performed by: PHYSICIAN ASSISTANT

## 2020-08-08 PROCEDURE — 99232 SBSQ HOSP IP/OBS MODERATE 35: CPT | Performed by: HOSPITALIST

## 2020-08-08 PROCEDURE — 85025 COMPLETE CBC W/AUTO DIFF WBC: CPT | Performed by: PHYSICIAN ASSISTANT

## 2020-08-08 RX ADMIN — CEFAZOLIN SODIUM 2000 MG: 2 SOLUTION INTRAVENOUS at 18:06

## 2020-08-08 RX ADMIN — APIXABAN 5 MG: 5 TABLET, FILM COATED ORAL at 18:05

## 2020-08-08 RX ADMIN — DILTIAZEM HYDROCHLORIDE 300 MG: 180 CAPSULE, COATED, EXTENDED RELEASE ORAL at 10:03

## 2020-08-08 RX ADMIN — CEFAZOLIN SODIUM 2000 MG: 2 SOLUTION INTRAVENOUS at 02:31

## 2020-08-08 RX ADMIN — SPIRONOLACTONE 25 MG: 25 TABLET ORAL at 10:03

## 2020-08-08 RX ADMIN — CEFAZOLIN SODIUM 2000 MG: 2 SOLUTION INTRAVENOUS at 10:03

## 2020-08-08 RX ADMIN — APIXABAN 5 MG: 5 TABLET, FILM COATED ORAL at 10:03

## 2020-08-08 NOTE — PROGRESS NOTES
Progress Note - Jessica Gomez 1950, 65 y o  male MRN: [id number]    Unit/Bed#: -01 Encounter: [id number]    Primary Care Provider: Catrina Finnegan DO   Date and time admitted to hospital: 8/6/2020  3:32 AM        * SIRS (systemic inflammatory response syndrome) ([facility] )  Assessment & Plan  · Status post a Triple phase bone scan on 08/07/2020 - have reached out to the radiology reading room, hopefully we will have the results of the triple phase bone scan soon  · Wound culture shows Staphylococcus aureus, await to see if this is methicillin-resistant on methicillin sensitive  · Of note, the MRSA nasal swab was negative  · Potential discharge planning for tomorrow morning, after the bone scan and cultures have been finalized  · Status post an ID evaluation on this admission also    Closed displaced bimalleolar fracture of left lower leg with routine healing  Assessment & Plan  · Orthopedic consult appreciated  · X-ray of the ankle- read pending  · Triple phase bone scan pending  · See the outline above    Weakness generalized  Assessment & Plan  · Status post a PT and OT evaluation, okay for discharge home at time of discharge    Thrombocytosis ([facility] )  Assessment & Plan  · Suspect reactive    Persistent atrial fibrillation (HCC)  Assessment & Plan  · Continue Cardizem for rate control  · Continue Eliquis for anticoagulation    Essential hypertension  Assessment & Plan  · Continue Cardizem  · Monitor blood pressure        VTE Prophylaxis:  Apixaban (Eliquis)    Patient Centered Rounds: I have performed bedside rounds with nursing staff today      Discussions with Specialists or Other Care Team Provider:  Case management, nursing, pharmacy  Education and Discussions with Family / Patient:  No family members were present at the time of my bedside evaluation, nor did the patient want me to reach out to anyone    Current Length of Stay: 1 day(s)    Current Patient Status: Inpatient   Certification Statement: The patient will continue to require additional inpatient hospital stay due to The need for continued IV antibiotics    Discharge Plan:  Hopeful discharge planning for tomorrow    Code Status: Level 1 - Full Code    Subjective:   Patient seen and examined, complains of some generalized weakness but otherwise has no other specific complaints  Objective:     Vitals:   Temp (24hrs), Av 3 °F (36 3 °C), Min:97 1 °F (36 2 °C), Max:97 6 °F (36 4 °C)    Temp:  [97 1 °F (36 2 °C)-97 6 °F (36 4 °C)] 97 6 °F (36 4 °C)  HR:  [61-76] 76  Resp:  [17-19] 18  BP: (107-121)/(58-81) 115/58  SpO2:  [95 %-97 %] 95 %  Body mass index is 26 76 kg/m²  Input and Output Summary (last 24 hours): Intake/Output Summary (Last 24 hours) at 2020 1550  Last data filed at 2020 1834  Gross per 24 hour   Intake 120 ml   Output 150 ml   Net -30 ml       Physical Exam:   Physical Exam  Vitals signs and nursing note reviewed  Constitutional:       General: He is not in acute distress  Appearance: Normal appearance  He is not ill-appearing  HENT:      Head: Normocephalic and atraumatic  Nose: Nose normal    Eyes:      Extraocular Movements: Extraocular movements intact  Pupils: Pupils are equal, round, and reactive to light  Neck:      Musculoskeletal: Normal range of motion and neck supple  No neck rigidity or muscular tenderness  Cardiovascular:      Rate and Rhythm: Normal rate and regular rhythm  Pulses: Normal pulses  Heart sounds: Normal heart sounds  No murmur  No friction rub  No gallop  Pulmonary:      Effort: Pulmonary effort is normal       Breath sounds: Normal breath sounds  Abdominal:      General: There is no distension  Palpations: Abdomen is soft  There is no mass  Tenderness: There is no abdominal tenderness  There is no guarding or rebound  Musculoskeletal: Normal range of motion  General: No swelling or tenderness        Right lower leg: No edema  Left lower leg: No edema  Skin:     General: Skin is warm  Capillary Refill: Capillary refill takes less than 2 seconds  Findings: No erythema or rash  Neurological:      General: No focal deficit present  Mental Status: He is alert and oriented to person, place, and time  Mental status is at baseline  Psychiatric:         Mood and Affect: Mood normal          Behavior: Behavior normal          Additional Data:     Labs:    Results from last 7 days   Lab Units 08/08/20  0457   WBC Thousand/uL 12 40*   HEMOGLOBIN g/dL 14 3   HEMATOCRIT % 41 0*   PLATELETS Thousands/uL 495*   NEUTROS PCT % 58   LYMPHS PCT % 24   MONOS PCT % 11   EOS PCT % 7*     Results from last 7 days   Lab Units 08/08/20  0457  08/06/20  0430   SODIUM mmol/L 139   < > 137   POTASSIUM mmol/L 3 9   < > 4 3   CHLORIDE mmol/L 108*   < > 106   CO2 mmol/L 23   < > 22   BUN mg/dL 11   < > 19   CREATININE mg/dL 0 77   < > 0 95   CALCIUM mg/dL 8 7   < > 9 2   ALK PHOS U/L  --   --  76   ALT U/L  --   --  23   AST U/L  --   --  30    < > = values in this interval not displayed  * I Have Reviewed All Lab Data Listed Above  * Additional Pertinent Lab Tests Reviewed: [facility] Admission  Reviewed    Imaging:  Imaging Reports Reviewed Today Include:  None    Recent Cultures (last 7 days):     Results from last 7 days   Lab Units 08/06/20  1215 08/06/20  0430   BLOOD CULTURE   --  No Growth at 48 hrs  No Growth at 48 hrs     GRAM STAIN RESULT  No Polys or Bacteria seen  --    WOUND CULTURE  1+ Growth of Staphylococcus aureus*  --        Last 24 Hours Medication List:   Current Facility-Administered Medications   Medication Dose Route Frequency Provider Last Rate    acetaminophen  650 mg Oral Q6H PRN BETH Melgar      apixaban  5 mg Oral BID BETH Melgar      cefazolin  2,000 mg Intravenous Q8H Zakia Pastor PA-C 2,000 mg (08/08/20 1003)    diltiazem  300 mg Oral Daily BETH Melgar      ondansetron  4 mg Intravenous Q6H PRN BETH Cardoza      spironolactone  25 mg Oral Daily BETH Melgar          Today, Patient Was Seen By: Evita Boyer MD    ** Please Note: Dictation voice to text software may have been used in the creation of this document   **

## 2020-08-08 NOTE — ASSESSMENT & PLAN NOTE
· Orthopedic consult appreciated  · X-ray of the ankle- read pending  · Triple phase bone scan pending     · See the outline above

## 2020-08-08 NOTE — ASSESSMENT & PLAN NOTE
· Status post a Triple phase bone scan on 08/07/2020 - have reached out to the radiology reading room, hopefully we will have the results of the triple phase bone scan soon  · Wound culture shows Staphylococcus aureus, await to see if this is methicillin-resistant on methicillin sensitive  · Of note, the MRSA nasal swab was negative  · Potential discharge planning for tomorrow morning, after the bone scan and cultures have been finalized    · Status post an ID evaluation on this admission also

## 2020-08-08 NOTE — PLAN OF CARE
Problem: INFECTION - ADULT  Goal: Absence or prevention of progression during hospitalization  Description: INTERVENTIONS:  - Assess and monitor for signs and symptoms of infection  - Monitor lab/diagnostic results  - Monitor all insertion sites, i e  indwelling lines, tubes, and drains  - Monitor endotracheal if appropriate and nasal secretions for changes in amount and color  - [place] appropriate cooling/warming therapies per order  - Administer medications as ordered  - Instruct and encourage patient and family to use good hand hygiene technique  - Identify and instruct in appropriate isolation precautions for identified infection/condition  Outcome: Progressing  Goal: Absence of fever/infection during neutropenic period  Description: INTERVENTIONS:  - Monitor WBC    Outcome: Progressing

## 2020-08-09 VITALS
TEMPERATURE: 97.5 F | HEART RATE: 74 BPM | SYSTOLIC BLOOD PRESSURE: 130 MMHG | DIASTOLIC BLOOD PRESSURE: 92 MMHG | OXYGEN SATURATION: 96 % | RESPIRATION RATE: 18 BRPM | WEIGHT: 184.3 LBS | HEIGHT: 70 IN | BODY MASS INDEX: 26.39 KG/M2

## 2020-08-09 LAB
ANION GAP SERPL CALCULATED.3IONS-SCNC: 8 MMOL/L (ref 4–13)
BACTERIA WND AEROBE CULT: ABNORMAL
BASOPHILS # BLD AUTO: 0 THOUSANDS/ΜL (ref 0–0.1)
BASOPHILS NFR BLD AUTO: 0 % (ref 0–2)
BUN SERPL-MCNC: 13 MG/DL (ref 7–25)
CALCIUM SERPL-MCNC: 9 MG/DL (ref 8.6–10.5)
CHLORIDE SERPL-SCNC: 107 MMOL/L (ref 98–107)
CO2 SERPL-SCNC: 25 MMOL/L (ref 21–31)
CREAT SERPL-MCNC: 0.79 MG/DL (ref 0.7–1.3)
EOSINOPHIL # BLD AUTO: 0.7 THOUSAND/ΜL (ref 0–0.61)
EOSINOPHIL NFR BLD AUTO: 6 % (ref 0–5)
ERYTHROCYTE [DISTWIDTH] IN BLOOD BY AUTOMATED COUNT: 13.8 % (ref 11.5–14.5)
GFR SERPL CREATININE-BSD FRML MDRD: 90 ML/MIN/1.73SQ M
GLUCOSE SERPL-MCNC: 88 MG/DL (ref 65–99)
GRAM STN SPEC: ABNORMAL
HCT VFR BLD AUTO: 42.8 % (ref 42–47)
HGB BLD-MCNC: 14.4 G/DL (ref 14–18)
LYMPHOCYTES # BLD AUTO: 3 THOUSANDS/ΜL (ref 0.6–4.47)
LYMPHOCYTES NFR BLD AUTO: 23 % (ref 21–51)
MCH RBC QN AUTO: 33.3 PG (ref 26–34)
MCHC RBC AUTO-ENTMCNC: 33.7 G/DL (ref 31–37)
MCV RBC AUTO: 99 FL (ref 81–99)
MONOCYTES # BLD AUTO: 1.3 THOUSAND/ΜL (ref 0.17–1.22)
MONOCYTES NFR BLD AUTO: 11 % (ref 2–12)
NEUTROPHILS # BLD AUTO: 7.7 THOUSANDS/ΜL (ref 1.4–6.5)
NEUTS SEG NFR BLD AUTO: 60 % (ref 42–75)
PLATELET # BLD AUTO: 553 THOUSANDS/UL (ref 149–390)
PMV BLD AUTO: 8 FL (ref 8.6–11.7)
POTASSIUM SERPL-SCNC: 3.9 MMOL/L (ref 3.5–5.5)
RBC # BLD AUTO: 4.33 MILLION/UL (ref 4.3–5.9)
SODIUM SERPL-SCNC: 140 MMOL/L (ref 134–143)
WBC # BLD AUTO: 12.8 THOUSAND/UL (ref 4.8–10.8)

## 2020-08-09 PROCEDURE — 99239 HOSP IP/OBS DSCHRG MGMT >30: CPT | Performed by: HOSPITALIST

## 2020-08-09 PROCEDURE — 80048 BASIC METABOLIC PNL TOTAL CA: CPT | Performed by: HOSPITALIST

## 2020-08-09 PROCEDURE — 85025 COMPLETE CBC W/AUTO DIFF WBC: CPT | Performed by: HOSPITALIST

## 2020-08-09 RX ORDER — DOXYCYCLINE HYCLATE 100 MG/1
100 CAPSULE ORAL EVERY 12 HOURS SCHEDULED
Status: DISCONTINUED | OUTPATIENT
Start: 2020-08-09 | End: 2020-08-09 | Stop reason: HOSPADM

## 2020-08-09 RX ORDER — DOXYCYCLINE HYCLATE 100 MG/1
100 CAPSULE ORAL EVERY 12 HOURS SCHEDULED
Qty: 14 CAPSULE | Refills: 0 | Status: SHIPPED | OUTPATIENT
Start: 2020-08-09 | End: 2020-08-16

## 2020-08-09 RX ADMIN — CEFAZOLIN SODIUM 2000 MG: 2 SOLUTION INTRAVENOUS at 02:19

## 2020-08-09 RX ADMIN — DOXYCYCLINE 100 MG: 100 CAPSULE ORAL at 16:21

## 2020-08-09 RX ADMIN — APIXABAN 5 MG: 5 TABLET, FILM COATED ORAL at 10:04

## 2020-08-09 RX ADMIN — CEFAZOLIN SODIUM 2000 MG: 2 SOLUTION INTRAVENOUS at 10:04

## 2020-08-09 NOTE — ASSESSMENT & PLAN NOTE
· Status post a Triple phase bone scan on 08/07/2020 -no evidence of osteomyelitis  · Status post an Infectious Disease evaluation  · DC home on 7 days worth of p o   Doxycycline-prescription provided for the same  · Outpatient follow-up with PCP, Orthopedics and ID

## 2020-08-09 NOTE — ASSESSMENT & PLAN NOTE
· Orthopedic consult appreciated     · X-ray of the ankle- no osteo  · Triple phase bone - no osteo  · DC home

## 2020-08-09 NOTE — PLAN OF CARE
Problem: Potential for Falls  Goal: Patient will remain free of falls  Description: INTERVENTIONS:  - Assess patient frequently for physical needs  -  Identify cognitive and physical deficits and behaviors that affect risk of falls    -  [place] fall precautions as indicated by assessment   - Educate patient/family on patient safety including physical limitations  - Instruct patient to call for assistance with activity based on assessment  - Modify environment to reduce risk of injury  - Consider OT/PT consult to assist with strengthening/mobility  Outcome: Progressing     Problem: Prexisting or High Potential for Compromised Skin Integrity  Goal: Skin integrity is maintained or improved  Description: INTERVENTIONS:  - Identify patients at risk for skin breakdown  - Assess and monitor skin integrity  - Assess and monitor nutrition and hydration status  - Monitor labs   - Assess for incontinence   - Turn and reposition patient  - Assist with mobility/ambulation  - Relieve pressure over bony prominences  - Avoid friction and shearing  - Provide appropriate hygiene as needed including keeping skin clean and dry  - Evaluate need for skin moisturizer/barrier cream  - Collaborate with interdisciplinary team   - Patient/family teaching  - Consider wound care consult   Outcome: Progressing     Problem: PAIN - ADULT  Goal: Verbalizes/displays adequate comfort level or baseline comfort level  Description: Interventions:  - Encourage patient to monitor pain and request assistance  - Assess pain using appropriate pain scale  - Administer analgesics based on type and severity of pain and evaluate response  - Implement non-pharmacological measures as appropriate and evaluate response  - Consider cultural and social influences on pain and pain management  - Notify physician/advanced practitioner if interventions unsuccessful or patient reports new pain  Outcome: Progressing     Problem: INFECTION - ADULT  Goal: Absence or prevention of progression during hospitalization  Description: INTERVENTIONS:  - Assess and monitor for signs and symptoms of infection  - Monitor lab/diagnostic results  - Monitor all insertion sites, i e  indwelling lines, tubes, and drains  - Monitor endotracheal if appropriate and nasal secretions for changes in amount and color  - [place] appropriate cooling/warming therapies per order  - Administer medications as ordered  - Instruct and encourage patient and family to use good hand hygiene technique  - Identify and instruct in appropriate isolation precautions for identified infection/condition  Outcome: Progressing  Goal: Absence of fever/infection during neutropenic period  Description: INTERVENTIONS:  - Monitor WBC    Outcome: Progressing     Problem: SAFETY ADULT  Goal: Maintain or return to baseline ADL function  Description: INTERVENTIONS:  -  Assess patient's ability to carry out ADLs; assess patient's baseline for ADL function and identify physical deficits which impact ability to perform ADLs (bathing, care of mouth/teeth, toileting, grooming, dressing, etc )  - Assess/evaluate cause of self-care deficits   - Assess range of motion  - Assess patient's mobility; develop plan if impaired  - Assess patient's need for assistive devices and provide as appropriate  - Encourage maximum independence but intervene and supervise when necessary  - Involve family in performance of ADLs  - Assess for home care needs following discharge   - Consider OT consult to assist with ADL evaluation and planning for discharge  - Provide patient education as appropriate  Outcome: Progressing  Goal: Maintain or return mobility status to optimal level  Description: INTERVENTIONS:  - Assess patient's baseline mobility status (ambulation, transfers, stairs, etc )    - Identify cognitive and physical deficits and behaviors that affect mobility  - Identify mobility aids required to assist with transfers and/or ambulation (gait belt, sit-to-stand, lift, walker, cane, etc )  - [place] fall precautions as indicated by assessment  - Record patient progress and toleration of activity level on Mobility SBAR; progress patient to next Phase/Stage  - Instruct patient to call for assistance with activity based on assessment  - Consider rehabilitation consult to assist with strengthening/weightbearing, etc   Outcome: Progressing     Problem: DISCHARGE PLANNING  Goal: Discharge to home or other facility with appropriate resources  Description: INTERVENTIONS:  - Identify barriers to discharge w/patient and caregiver  - Arrange for needed discharge resources and transportation as appropriate  - Identify discharge learning needs (meds, wound care, etc )  - Arrange for interpretive services to assist at discharge as needed  - Refer to Case Management Department for coordinating discharge planning if the patient needs post-hospital services based on physician/advanced practitioner order or complex needs related to functional status, cognitive ability, or social support system  Outcome: Progressing     Problem: Knowledge Deficit  Goal: Patient/family/caregiver demonstrates understanding of disease process, treatment plan, medications, and discharge instructions  Description: Complete learning assessment and assess knowledge base    Interventions:  - Provide teaching at level of understanding  - Provide teaching via preferred learning methods  Outcome: Progressing     Problem: METABOLIC, FLUID AND ELECTROLYTES - ADULT  Goal: Electrolytes maintained within normal limits  Description: INTERVENTIONS:  - Monitor labs and assess patient for signs and symptoms of electrolyte imbalances  - Administer electrolyte replacement as ordered  - Monitor response to electrolyte replacements, including repeat lab results as appropriate  - Instruct patient on fluid and nutrition as appropriate  Outcome: Progressing  Goal: Fluid balance maintained  Description: INTERVENTIONS:  - Monitor labs   - Monitor I/O and WT  - Instruct patient on fluid and nutrition as appropriate  - Assess for signs & symptoms of volume excess or deficit  Outcome: Progressing  Goal: Glucose maintained within target range  Description: INTERVENTIONS:  - Monitor Blood Glucose as ordered  - Assess for signs and symptoms of hyperglycemia and hypoglycemia  - Administer ordered medications to maintain glucose within target range  - Assess nutritional intake and initiate nutrition service referral as needed  Outcome: Progressing     Problem: SKIN/TISSUE INTEGRITY - ADULT  Goal: Skin integrity remains intact  Description: INTERVENTIONS  - Identify patients at risk for skin breakdown  - Assess and monitor skin integrity  - Assess and monitor nutrition and hydration status  - Monitor labs (i e  albumin)  - Assess for incontinence   - Turn and reposition patient  - Assist with mobility/ambulation  - Relieve pressure over bony prominences  - Avoid friction and shearing  - Provide appropriate hygiene as needed including keeping skin clean and dry  - Evaluate need for skin moisturizer/barrier cream  - Collaborate with interdisciplinary team (i e  Nutrition, Rehabilitation, etc )   - Patient/family teaching  Outcome: Progressing  Goal: Incision(s), wounds(s) or drain site(s) healing without S/S of infection  Description: INTERVENTIONS  - Assess and document risk factors for skin impairment   - Assess and document dressing, incision, wound bed, drain sites and surrounding tissue  - Consider nutrition services referral as needed  - Oral mucous membranes remain intact  - Provide patient/ family education  Outcome: Progressing  Goal: Oral mucous membranes remain intact  Description: INTERVENTIONS  - Assess oral mucosa and hygiene practices  - Implement preventative oral hygiene regimen  - Implement oral medicated treatments as ordered  - Initiate Nutrition services referral as needed  Outcome: Progressing Problem: MUSCULOSKELETAL - ADULT  Goal: Maintain or return mobility to safest level of function  Description: INTERVENTIONS:  - Assess patient's ability to carry out ADLs; assess patient's baseline for ADL function and identify physical deficits which impact ability to perform ADLs (bathing, care of mouth/teeth, toileting, grooming, dressing, etc )  - Assess/evaluate cause of self-care deficits   - Assess range of motion  - Assess patient's mobility  - Assess patient's need for assistive devices and provide as appropriate  - Encourage maximum independence but intervene and supervise when necessary  - Involve family in performance of ADLs  - Assess for home care needs following discharge   - Consider OT consult to assist with ADL evaluation and planning for discharge  - Provide patient education as appropriate  Outcome: Progressing  Goal: Maintain proper alignment of affected body part  Description: INTERVENTIONS:  - Support, maintain and protect limb and body alignment  - Provide patient/ family with appropriate education  Outcome: Progressing

## 2020-08-09 NOTE — DISCHARGE INSTR - AVS FIRST PAGE
Dear Kasey Muller,     It was our pleasure to care for you here at [facility]  It is our hope that we were always able to exceed the expected standards for your care during your stay  You were hospitalized due to an infection in the right lower extremity  You were cared for on the medical/ floor by Tea Lance MD with the [facility] who covers for your primary care physician (PCP), Dima Stiles, DO, while you were hospitalized  You are also seen by Dr Jyothi Acevedo of [facility] infectious diseases  If you have any questions or concerns related to this hospitalization, you may contact us at [phone]  For follow up as well as any medication refills, we recommend that you follow up with your primary care physician  A registered nurse will reach out to you by phone within a few days after your discharge to answer any additional questions that you may have after going home  However, at this time we provide for you here, the most important instructions / recommendations at discharge:     · Notable Medication Adjustments -   · Prescription for doxycycline has been provided, take 100 mg 1 tablet twice a day for 7 days  · Testing Required after Discharge -   · None  · Important follow up information -   · Please ensure you follow-up with orthopedic surgery, Infectious Disease, annual primary care physician  · Other Instructions -   · Please maintain a healthy diet  · Please review this entire after visit summary as additional general instructions including medication list, appointments, activity, diet, any pertinent wound care, and other additional recommendations from your care team that may be provided for you        Sincerely,     Tea Lance MD

## 2020-08-09 NOTE — ASSESSMENT & PLAN NOTE
· Continue Cardizem for rate control  · Continue Eliquis for anticoagulation  · Okay for discharge home on same meds

## 2020-08-09 NOTE — DISCHARGE SUMMARY
Discharge- Oralee Lime 1950, 65 y o  male MRN: [id number]    Unit/Bed#: -01 Encounter: [id number]    Primary Care Provider: Douglas Vee DO   Date and time admitted to hospital: 8/6/2020  3:32 AM        * SIRS (systemic inflammatory response syndrome) ([facility] [address] )  Assessment & Plan  · Status post a Triple phase bone scan on 08/07/2020 -no evidence of osteomyelitis  · Status post an Infectious Disease evaluation  · DC home on 7 days worth of p o  Doxycycline-prescription provided for the same  · Outpatient follow-up with PCP, Orthopedics and ID    Closed displaced bimalleolar fracture of left lower leg with routine healing  Assessment & Plan  · Orthopedic consult appreciated     · X-ray of the ankle- no osteo  · Triple phase bone - no osteo  · DC home    Weakness generalized  Assessment & Plan  · Status post a PT and OT evaluation, okay for discharge home at time of discharge    Thrombocytosis ([address] )  Assessment & Plan  · Suspect reactive    Persistent atrial fibrillation ([facility] [address] )  Assessment & Plan  · Continue Cardizem for rate control  · Continue Eliquis for anticoagulation  · Okay for discharge home on same meds    Essential hypertension  Assessment & Plan  · Okay for DC home on Cardizem          Discharging Physician / Practitioner: Sydney Granados MD  PCP: Douglas Vee DO  Admission Date:   Admission Orders (From admission, onward)     Ordered        08/07/20 1034  Inpatient Admission  Once         08/06/20 0641  Place in Observation  Once                   Discharge Date: 08/09/20    Resolved Problems  Date Reviewed: 8/7/2020    None          Consultations During Hospital Stay:  · Infectious disease    Procedures Performed:   · None    Significant Findings / Test Results:   · Chest x-ray-no acute cardiopulmonary disease  · Nuclear medicine bone scan - Nonspecific increased bone phase uptake in the distal tibial plafond could be reparative in nature given recency of surgery   Increased flow and pool phase uptake appears to be diffuse throughout the extremity, especially in the soft tissues rather than focally osteocentric   Ultimately nuclear leukocyte scintigraphy or MRI may offer greater specificity  Incidental Findings:   · X-ray left ankle- Minimal interval albeit incomplete fracture healing post bimalleolar ORIF  Hardware is intact  No evidence of loosening  No radiographic evidence of osteomyelitis  · Nuclear medicine bone scan - Nonspecific increased bone phase uptake in the distal tibial plafond could be reparative in nature given recency of surgery   Increased flow and pool phase uptake appears to be diffuse throughout the extremity, especially in the soft tissues rather than focally osteocentric   Ultimately nuclear leukocyte scintigraphy or MRI may offer greater specificity  Test Results Pending at Discharge (will require follow up): · None     Outpatient Tests Requested:  · None    Complications:     None    Reason for Admission:  SIRS    Hospital Course:     Eulogio Clark is a 65 y o  male patient who originally presented to the hospital on 8/6/2020 due to generalized weakness  Please refer to the initial history and physical examination completed by Kushal Oliveira for the initial presenting features and complaints  In brief, the patient is a 57-year-old male, who was admitted to the hospital after he presented to the emergency department with generalized weakness  He met criteria for sepsis and SIRS  He was started on broad-spectrum IV antibiotics could  The source was suspected to be secondary to a history of a left-sided recent ankle fracture with wound dehiscence  Patient was treated with IV antibiotics  Patient was seen by infectious disease  Wound culture ultimately came back for Staph aureus  Nasal MRSA was negative for MRSA  To be on the safe side a bone scan was performed  Bone scan was not conclusive of osteomyelitis    Patient was deemed stable for discharge on 08/09/2020  His sepsis and SIRS criteria had completely resolved  Patient was discharged home on 7 more days worth of oral doxycycline  Prescription provided for the same  Additionally, patient will follow up in the outpatient setting with Orthopedics, his primary care physician, and ID  He was released home on all of his other pre-admission medications at the preadmission dosages  Please see above list of diagnoses and related plan for additional information  Condition at Discharge: good     Discharge Day Visit / Exam:     Subjective:  Patient seen and examined, no complaints no distress  Vitals: Blood Pressure: 98/60 (08/09/20 1003)  Pulse: 62 (08/09/20 0727)  Temperature: 98 °F (36 7 °C) (08/09/20 0727)  Temp Source: Temporal (08/09/20 0727)  Respirations: 18 (08/09/20 0727)  Height: 5' 10" (177 8 cm) (08/06/20 0736)  Weight - Scale: 83 6 kg (184 lb 4 9 oz) (08/09/20 0600)  SpO2: 94 % (08/09/20 0727)  Exam:   Physical Exam  Vitals signs and nursing note reviewed  Constitutional:       General: He is not in acute distress  Appearance: Normal appearance  He is not ill-appearing  HENT:      Head: Normocephalic and atraumatic  Nose: Nose normal    Eyes:      Extraocular Movements: Extraocular movements intact  Pupils: Pupils are equal, round, and reactive to light  Neck:      Musculoskeletal: Normal range of motion and neck supple  No neck rigidity or muscular tenderness  Cardiovascular:      Rate and Rhythm: Normal rate and regular rhythm  Pulses: Normal pulses  Heart sounds: Normal heart sounds  No murmur  No friction rub  No gallop  Pulmonary:      Effort: Pulmonary effort is normal       Breath sounds: Normal breath sounds  Abdominal:      General: There is no distension  Palpations: Abdomen is soft  There is no mass  Tenderness: There is no abdominal tenderness  There is no guarding or rebound  Musculoskeletal: Normal range of motion  General: No swelling or tenderness  Right lower leg: No edema  Left lower leg: No edema  Comments: Left lower extremity dressing is in place, dry and intact   Skin:     General: Skin is warm  Capillary Refill: Capillary refill takes less than 2 seconds  Findings: No erythema or rash  Neurological:      General: No focal deficit present  Mental Status: He is alert and oriented to person, place, and time  Mental status is at baseline  Psychiatric:         Mood and Affect: Mood normal          Behavior: Behavior normal          Discussion with Family:  Patient's wife Bri Garcia was brought up to [facility] via telephone at time of discharge, all questions answered to her satisfaction    Discharge instructions/Information to patient and family:   See after visit summary for information provided to patient and family  Provisions for Follow-Up Care:  See after visit summary for information related to follow-up care and any pertinent home health orders  Disposition:     Home    For Discharges to   [facility]:   · Not Applicable to this Patient - Not Applicable to this Patient    Planned Readmission:    None     Discharge Statement:  I spent 40 minutes discharging the patient  This time was spent on the day of discharge  I had direct contact with the patient on the day of discharge  Greater than 50% of the total time was spent examining patient, answering all patient questions, arranging and discussing plan of care with patient as well as directly providing post-discharge instructions  Additional time then spent on discharge activities  Discharge Medications:  See after visit summary for reconciled discharge medications provided to patient and family        ** Please Note: This note has been constructed using a voice recognition system **

## 2020-08-09 NOTE — PLAN OF CARE
Problem: INFECTION - ADULT  Goal: Absence or prevention of progression during hospitalization  Description: INTERVENTIONS:  - Assess and monitor for signs and symptoms of infection  - Monitor lab/diagnostic results  - Monitor all insertion sites, i e  indwelling lines, tubes, and drains  - Monitor endotracheal if appropriate and nasal secretions for changes in amount and color  - [place] appropriate cooling/warming therapies per order  - Administer medications as ordered  - Instruct and encourage patient and family to use good hand hygiene technique  - Identify and instruct in appropriate isolation precautions for identified infection/condition  Outcome: Progressing  Goal: Absence of fever/infection during neutropenic period  Description: INTERVENTIONS:  - Monitor WBC    Outcome: Progressing

## 2020-08-09 NOTE — NURSING NOTE
Patient medically cleared to be DC home today, IV removed, DC instructions reviewed and given to patient, pt denied any questions or concerns  Patient instructed to continue to take all med's as prescribed, follow up with all MD appointment's and educated on infection prevention: proper hand hygiene, cover cough and sneeze into upper arm, avoid any one who is sick, stay home if sick and wear mask when out in public, pt verbalized understanding

## 2020-08-11 LAB
BACTERIA BLD CULT: NORMAL
BACTERIA BLD CULT: NORMAL

## 2020-08-19 ENCOUNTER — OFFICE VISIT (OUTPATIENT)
Dept: OBGYN CLINIC | Facility: CLINIC | Age: 70
End: 2020-08-19
Payer: COMMERCIAL

## 2020-08-19 VITALS
WEIGHT: 195 LBS | BODY MASS INDEX: 27.92 KG/M2 | DIASTOLIC BLOOD PRESSURE: 70 MMHG | HEART RATE: 51 BPM | SYSTOLIC BLOOD PRESSURE: 126 MMHG | TEMPERATURE: 96.4 F | HEIGHT: 70 IN

## 2020-08-19 DIAGNOSIS — S82.842D CLOSED DISPLACED BIMALLEOLAR FRACTURE OF LEFT LOWER LEG WITH ROUTINE HEALING: Primary | ICD-10-CM

## 2020-08-19 PROCEDURE — 99213 OFFICE O/P EST LOW 20 MIN: CPT | Performed by: ORTHOPAEDIC SURGERY

## 2020-08-19 NOTE — PROGRESS NOTES
ASSESSMENT/PLAN:    Diagnoses and all orders for this visit:    Closed displaced bimalleolar fracture of left lower leg with routine healing        Plan:  I would recommend follow-up in 4-6 weeks  X-rays of his ankle will be obtained at that time  We discussed removal of the syndesmotic screw as the x-rays from 08/06/2020 do demonstrate slight lucency  However, he is not commit all, as his his wife, in regards to proceeding with any additional surgery  I have instructed them to contact me for more urgent re-evaluation if they were to elect to proceed with syndesmotic screw removal   In the interim, he is to continue with the walker cast boot but is permitted bearing weight as tolerated  Return for 4 - 6 weeks  _____________________________________________________  CHIEF COMPLAINT:  Chief Complaint   Patient presents with    Left Ankle - Fracture, Pain         SUBJECTIVE:  Steph Belle is a 65y o  year old male who presents for follow up of his left ankle  Was admitted to the hospital about a week and half ago secondary to a concern with some lethargy, some confusion and underwent evaluation by Orthopedics  He was noted to have no evidence to suggest infection although the medical doctors did feel that the site of potential infection might be his ankle  His wife states that culture showed growth of a Staph species  He was initially placed on antibiotics, these were discontinued by Orthopedics based upon the appearance of his ankle wound and then re-initiated by ID after ID was consulted  According to Rachel, the PA working with me in the office today and who had seen the patient in the hospital, the nurse obtained this culture without any prep of the skin to minimize the potential for skin contamination  Thao Hernandez is asymptomatic at this time    His wife states that the wound looks benign and has improved from the time that he was in the hospital and states that she had no concerned the wound was infected when he was in the hospital       PAST MEDICAL HISTORY:  Past Medical History:   Diagnosis Date    Atrial fibrillation ([address] )     Hypertension        PAST SURGICAL HISTORY:  Past Surgical History:   Procedure Laterality Date    CATARACT EXTRACTION, BILATERAL      HERNIA REPAIR      umbilical    ORIF TIBIA & FIBULA FRACTURES Left 5/26/2020    Procedure: OPEN REDUCTION W/ INTERNAL FIXATION (ORIF) ANKLE;  Surgeon: Mary Estrada; Location: [facility] MAIN OR;  Service: Orthopedics       FAMILY HISTORY:  Family History   Problem Relation Age of Onset    No Known Problems Mother        SOCIAL HISTORY:  Social History     Tobacco Use    Smoking status: Never Smoker    Smokeless tobacco: Never Used   Substance Use Topics    Alcohol use: Not Currently     Frequency: Never    Drug use: Never       MEDICATIONS:    Current Outpatient Medications:     acetaminophen (TYLENOL) 325 mg tablet, Take 2 tablets (650 mg total) by mouth every 6 (six) hours as needed (Pain), Disp: 100 tablet, Rfl: 0    apixaban (ELIQUIS) 5 mg, Take 5 mg by mouth 2 (two) times a day, Disp: , Rfl:     diltiazem (TIAZAC) 300 MG 24 hr capsule, Take 300 mg by mouth daily, Disp: , Rfl:     Gauze Bandages (JUAN FLUFF ROLLS) MISC, by Does not apply route daily, Disp: 15 each, Rfl: 2    spironolactone (ALDACTONE) 25 mg tablet, Take 25 mg by mouth daily, Disp: , Rfl:     ALLERGIES:  No Known Allergies    REVIEW OF SYSTEMS:  Pertinent items are noted in HPI  A comprehensive review of systems was negative       _____________________________________________________  PHYSICAL EXAMINATION:  General: alert and appears comfortable  Psychiatric: Normal  HEENT:  Normocephalic, atraumatic  Cardiovascular:  Regular  Pulmonary: No wheezing or stridor  Skin: No masses, erthema, lacerations, fluctation, ulcerations  Neurovascular: Motor and sensory exams are grossly intact    Pulses are palpable although he does continue to demonstrate generalized lower extremity lymphedema  Good color and capillary refill is noted  MUSCULOSKELETAL EXAMINATION:    The left ankle incision is benign with an area no more than about 3 mm in length and less than a mm in width with no erythema, drainage or localized tenderness  He has good ankle range of motion without complaint  The distal tibia is nontender including the medial malleolar level     _____________________________________________________  STUDIES REVIEWED:  X-rays from 08/06/2020 were reviewed as well as the bone scan  The x-rays demonstrate progression in healing compared to the most recent films from July  There is slight lucency around the syndesmotic screw  However, I see no evidence of back out of the screw  The bone scan does not suggest evidence of osteomyelitis  Increased uptake is consistent with his fracture and subsequent healing              Shama Kennedy

## 2020-08-23 ENCOUNTER — HOSPITAL ENCOUNTER (EMERGENCY)
Facility: HOSPITAL | Age: 70
Discharge: HOME | End: 2020-08-23
Attending: EMERGENCY MEDICINE
Payer: COMMERCIAL

## 2020-08-23 VITALS
RESPIRATION RATE: 18 BRPM | TEMPERATURE: 96.8 F | WEIGHT: 200 LBS | DIASTOLIC BLOOD PRESSURE: 97 MMHG | BODY MASS INDEX: 28.7 KG/M2 | SYSTOLIC BLOOD PRESSURE: 140 MMHG | OXYGEN SATURATION: 95 % | HEART RATE: 67 BPM

## 2020-08-23 DIAGNOSIS — R25.9 PARKINSONIAN FEATURES: Primary | ICD-10-CM

## 2020-08-23 DIAGNOSIS — R53.1 GENERALIZED WEAKNESS: ICD-10-CM

## 2020-08-23 LAB
ALBUMIN SERPL BCP-MCNC: 3 G/DL (ref 3.5–5.7)
ALP SERPL-CCNC: 62 U/L (ref 55–165)
ALT SERPL W P-5'-P-CCNC: 12 U/L (ref 7–52)
ANION GAP SERPL CALCULATED.3IONS-SCNC: 7 MMOL/L (ref 4–13)
APTT PPP: 34 SECONDS (ref 23–37)
AST SERPL W P-5'-P-CCNC: 17 U/L (ref 13–39)
BASOPHILS # BLD AUTO: 0.1 THOUSANDS/ΜL (ref 0–0.1)
BASOPHILS NFR BLD AUTO: 1 % (ref 0–2)
BILIRUB SERPL-MCNC: 1.2 MG/DL (ref 0.2–1)
BILIRUB UR QL STRIP: NEGATIVE
BUN SERPL-MCNC: 21 MG/DL (ref 7–25)
CALCIUM SERPL-MCNC: 9 MG/DL (ref 8.6–10.5)
CHLORIDE SERPL-SCNC: 110 MMOL/L (ref 98–107)
CLARITY UR: ABNORMAL
CO2 SERPL-SCNC: 26 MMOL/L (ref 21–31)
COLOR UR: ABNORMAL
CREAT SERPL-MCNC: 0.77 MG/DL (ref 0.7–1.3)
EOSINOPHIL # BLD AUTO: 0.4 THOUSAND/ΜL (ref 0–0.61)
EOSINOPHIL NFR BLD AUTO: 3 % (ref 0–5)
ERYTHROCYTE [DISTWIDTH] IN BLOOD BY AUTOMATED COUNT: 14.1 % (ref 11.5–14.5)
GFR SERPL CREATININE-BSD FRML MDRD: 91 ML/MIN/1.73SQ M
GLUCOSE SERPL-MCNC: 80 MG/DL (ref 65–99)
GLUCOSE UR STRIP-MCNC: NEGATIVE MG/DL
HCT VFR BLD AUTO: 42.6 % (ref 42–47)
HGB BLD-MCNC: 14.2 G/DL (ref 14–18)
HGB UR QL STRIP.AUTO: NEGATIVE
INR PPP: 1.3 (ref 0.84–1.19)
KETONES UR STRIP-MCNC: ABNORMAL MG/DL
LACTATE SERPL-SCNC: 0.9 MMOL/L (ref 0.5–2)
LEUKOCYTE ESTERASE UR QL STRIP: NEGATIVE
LYMPHOCYTES # BLD AUTO: 2.2 THOUSANDS/ΜL (ref 0.6–4.47)
LYMPHOCYTES NFR BLD AUTO: 21 % (ref 21–51)
MCH RBC QN AUTO: 33.5 PG (ref 26–34)
MCHC RBC AUTO-ENTMCNC: 33.4 G/DL (ref 31–37)
MCV RBC AUTO: 100 FL (ref 81–99)
MONOCYTES # BLD AUTO: 1.1 THOUSAND/ΜL (ref 0.17–1.22)
MONOCYTES NFR BLD AUTO: 10 % (ref 2–12)
NEUTROPHILS # BLD AUTO: 6.7 THOUSANDS/ΜL (ref 1.4–6.5)
NEUTS SEG NFR BLD AUTO: 65 % (ref 42–75)
NITRITE UR QL STRIP: NEGATIVE
PH UR STRIP.AUTO: 6 [PH]
PLATELET # BLD AUTO: 544 THOUSANDS/UL (ref 149–390)
PMV BLD AUTO: 7.9 FL (ref 8.6–11.7)
POTASSIUM SERPL-SCNC: 4 MMOL/L (ref 3.5–5.5)
PROT SERPL-MCNC: 5.5 G/DL (ref 6.4–8.9)
PROT UR STRIP-MCNC: NEGATIVE MG/DL
PROTHROMBIN TIME: 16 SECONDS (ref 11.6–14.5)
RBC # BLD AUTO: 4.25 MILLION/UL (ref 4.3–5.9)
SODIUM SERPL-SCNC: 143 MMOL/L (ref 134–143)
SP GR UR STRIP.AUTO: 1.02 (ref 1–1.03)
UROBILINOGEN UR QL STRIP.AUTO: 1 E.U./DL
WBC # BLD AUTO: 10.4 THOUSAND/UL (ref 4.8–10.8)

## 2020-08-23 PROCEDURE — 99284 EMERGENCY DEPT VISIT MOD MDM: CPT | Performed by: EMERGENCY MEDICINE

## 2020-08-23 PROCEDURE — 85730 THROMBOPLASTIN TIME PARTIAL: CPT | Performed by: EMERGENCY MEDICINE

## 2020-08-23 PROCEDURE — 99285 EMERGENCY DEPT VISIT HI MDM: CPT

## 2020-08-23 PROCEDURE — 81003 URINALYSIS AUTO W/O SCOPE: CPT | Performed by: EMERGENCY MEDICINE

## 2020-08-23 PROCEDURE — U0003 INFECTIOUS AGENT DETECTION BY NUCLEIC ACID (DNA OR RNA); SEVERE ACUTE RESPIRATORY SYNDROME CORONAVIRUS 2 (SARS-COV-2) (CORONAVIRUS DISEASE [COVID-19]), AMPLIFIED PROBE TECHNIQUE, MAKING USE OF HIGH THROUGHPUT TECHNOLOGIES AS DESCRIBED BY CMS-2020-01-R: HCPCS | Performed by: EMERGENCY MEDICINE

## 2020-08-23 PROCEDURE — 93005 ELECTROCARDIOGRAM TRACING: CPT

## 2020-08-23 PROCEDURE — 36415 COLL VENOUS BLD VENIPUNCTURE: CPT | Performed by: EMERGENCY MEDICINE

## 2020-08-23 PROCEDURE — 83605 ASSAY OF LACTIC ACID: CPT | Performed by: EMERGENCY MEDICINE

## 2020-08-23 PROCEDURE — 80053 COMPREHEN METABOLIC PANEL: CPT | Performed by: EMERGENCY MEDICINE

## 2020-08-23 PROCEDURE — 87040 BLOOD CULTURE FOR BACTERIA: CPT | Performed by: EMERGENCY MEDICINE

## 2020-08-23 PROCEDURE — 85610 PROTHROMBIN TIME: CPT | Performed by: EMERGENCY MEDICINE

## 2020-08-23 PROCEDURE — 84145 PROCALCITONIN (PCT): CPT | Performed by: EMERGENCY MEDICINE

## 2020-08-23 PROCEDURE — 85025 COMPLETE CBC W/AUTO DIFF WBC: CPT | Performed by: EMERGENCY MEDICINE

## 2020-08-23 NOTE — ED PROVIDER NOTES
History  Chief Complaint   Patient presents with    Weakness - Generalized     weakness in legs, trouble ambulating, no pain, recent admission for SIRS w/o source     HPI  Seen 8/6 for generalized weakness; admitted 8/7-8/9  SIRS without obvious source, considered incision from ORIF of left teto, osteo (bone scan neg for osteo)  Would cx grew Staph Aureus  Got a course of doxy  Finished this 4d ago, wife thinks he's becoming more uncoordinated/weak again  Pt states had been admitted for "confusion" but can't say in what way he was confused (notes from that visit note just generalized weakness)  Hasn't seen a neurologist/no known neurologic dx/no FHx stroke/parkinson's per pt  Taking all meds as Rx'd  States Sx getting worse again since he finished the Doxy ~4 days ago  Prior to Admission Medications   Prescriptions Last Dose Informant Patient Reported? Taking? Gauze Bandages (JUAN FLUFF ROLLS) MISC   No No   Sig: by Does not apply route daily   acetaminophen (TYLENOL) 325 mg tablet   No No   Sig: Take 2 tablets (650 mg total) by mouth every 6 (six) hours as needed (Pain)   apixaban (ELIQUIS) 5 mg   Yes No   Sig: Take 5 mg by mouth 2 (two) times a day   diltiazem (TIAZAC) 300 MG 24 hr capsule   Yes No   Sig: Take 300 mg by mouth daily   spironolactone (ALDACTONE) 25 mg tablet   Yes No   Sig: Take 25 mg by mouth daily      Facility-Administered Medications: None       Past Medical History:   Diagnosis Date    Atrial fibrillation ([address] )     Hypertension        Past Surgical History:   Procedure Laterality Date    CATARACT EXTRACTION, BILATERAL      HERNIA REPAIR      umbilical    ORIF TIBIA & FIBULA FRACTURES Left 5/26/2020    Procedure: OPEN REDUCTION W/ INTERNAL FIXATION (ORIF) ANKLE;  Surgeon: Nahum Neff;   Location: [facility] MAIN OR;  Service: Orthopedics       Family History   Problem Relation Age of Onset    No Known Problems Mother      I have reviewed and agree with the history as documented  E-Cigarette/Vaping    E-Cigarette Use Never User      E-Cigarette/Vaping Substances    Nicotine No     THC No     CBD No     Flavoring No     Other No     Unknown No      Social History     Tobacco Use    Smoking status: Never Smoker    Smokeless tobacco: Never Used   Substance Use Topics    Alcohol use: Not Currently     Frequency: Never    Drug use: Never       Review of Systems   Constitutional: Negative for chills, diaphoresis, fatigue and fever  HENT: Negative for congestion, sore throat and trouble swallowing  Eyes: Negative for pain, discharge, redness and itching  Respiratory: Negative for cough, chest tightness and shortness of breath  Cardiovascular: Negative for chest pain, palpitations and leg swelling  Gastrointestinal: Negative for blood in stool, constipation, diarrhea, nausea and vomiting  Genitourinary: Negative for difficulty urinating and dysuria  Musculoskeletal: Negative for gait problem, myalgias, neck pain and neck stiffness  All other systems reviewed and are negative  Physical Exam  Physical Exam  Vitals signs and nursing note reviewed  Constitutional:       General: He is not in acute distress  Appearance: He is well-developed  He is not diaphoretic  HENT:      Head: Normocephalic and atraumatic  Eyes:      General:         Right eye: No discharge  Left eye: No discharge  Conjunctiva/sclera: Conjunctivae normal       Pupils: Pupils are equal, round, and reactive to light  Neck:      Musculoskeletal: Neck supple  Cardiovascular:      Rate and Rhythm: Normal rate and regular rhythm  Heart sounds: Normal heart sounds  No murmur  No friction rub  No gallop  Pulmonary:      Effort: Pulmonary effort is normal  No respiratory distress  Breath sounds: Normal breath sounds  No wheezing or rales  Abdominal:      General: There is no distension  Palpations: Abdomen is soft  There is no mass  Tenderness: There is no abdominal tenderness  There is no guarding or rebound  Hernia: No hernia is present  Musculoskeletal:         General: No deformity  Skin:     General: Skin is warm and dry  Neurological:      Mental Status: He is alert and oriented to person, place, and time  Comments: Festinating gait  Pill rolling tremor at rest on the right    Slight right foot tremor at rest    Psychiatric:         Mood and Affect: Mood normal          Vital Signs  ED Triage Vitals   Temperature Pulse Respirations Blood Pressure SpO2   08/23/20 1610 08/23/20 1610 08/23/20 1610 08/23/20 1636 08/23/20 1610   (!) 96 8 °F (36 °C) 64 18 126/79 98 %      Temp Source Heart Rate Source Patient Position - Orthostatic VS BP Location FiO2 (%)   08/23/20 1610 08/23/20 1610 -- -- --   Temporal Monitor         Pain Score       --                  Vitals:    08/23/20 1610 08/23/20 1636 08/23/20 1730   BP:  126/79 (!) 152/101   Pulse: 64  66         Visual Acuity      ED Medications  Medications - No data to display    Diagnostic Studies  Results Reviewed     Procedure Component Value Units Date/Time    Novel Coronavirus (COVID-19), PCR [facility] [[id number]]     Lab Status:  No result Specimen:  Nares from Nose     Comprehensive metabolic panel [[id number]]  (Abnormal) Collected:  08/23/20 1629    Lab Status:  Final result Specimen:  Blood from Arm, Right Updated:  08/23/20 1833     Sodium 143 mmol/L      Potassium 4 0 mmol/L      Chloride 110 mmol/L      CO2 26 mmol/L      ANION GAP 7 mmol/L      BUN 21 mg/dL      Creatinine 0 77 mg/dL      Glucose 80 mg/dL      Calcium 9 0 mg/dL      AST 17 U/L      ALT 12 U/L      Alkaline Phosphatase 62 U/L      Total Protein 5 5 g/dL      Albumin 3 0 g/dL      Total Bilirubin 1 20 mg/dL      eGFR 91 ml/min/1 73sq m     Narrative:       [facility] guidelines for Chronic Kidney Disease (CKD):     Stage 1 with normal or high GFR (GFR > 90 mL/min/1 73 square meters)    Stage 2 Mild CKD (GFR = 60-89 mL/min/1 73 square meters)    Stage 3A Moderate CKD (GFR = 45-59 mL/min/1 73 square meters)    Stage 3B Moderate CKD (GFR = 30-44 mL/min/1 73 square meters)    Stage 4 Severe CKD (GFR = 15-29 mL/min/1 73 square meters)    Stage 5 End Stage CKD (GFR <15 mL/min/1 73 square meters)  Note: GFR calculation is accurate only with a steady state creatinine    UA w Reflex to Microscopic w Reflex to Culture [[id number]]  (Abnormal) Collected:  08/23/20 1734    Lab Status:  Final result Specimen:  Urine, Other Updated:  08/23/20 1749     Color, UA Straw     Clarity, UA Slightly Cloudy     Specific Gravity, UA 1 025     pH, UA 6 0     Leukocytes, UA Negative     Nitrite, UA Negative     Protein, UA Negative mg/dl      Glucose, UA Negative mg/dl      Ketones, UA Trace mg/dl      Urobilinogen, UA 1 0 E U /dl      Bilirubin, UA Negative     Blood, UA Negative    Protime-INR [[id number]]  (Abnormal) Collected:  08/23/20 1629    Lab Status:  Final result Specimen:  Blood from Arm, Right Updated:  08/23/20 1701     Protime 16 0 seconds      INR 1 30    APTT [[id number]]  (Normal) Collected:  08/23/20 1629    Lab Status:  Final result Specimen:  Blood from Arm, Right Updated:  08/23/20 1701     PTT 34 seconds     Lactic acid [[id number]]  (Normal) Collected:  08/23/20 1629    Lab Status:  Final result Specimen:  Blood from Arm, Right Updated:  08/23/20 1657     LACTIC ACID 0 9 mmol/L     Narrative:       Result may be elevated if tourniquet was used during collection      CBC and differential [[id number]]  (Abnormal) Collected:  08/23/20 1629    Lab Status:  Final result Specimen:  Blood from Arm, Right Updated:  08/23/20 1641     WBC 10 40 Thousand/uL      RBC 4 25 Million/uL      Hemoglobin 14 2 g/dL      Hematocrit 42 6 %       fL      MCH 33 5 pg      MCHC 33 4 g/dL      RDW 14 1 %      MPV 7 9 fL      Platelets 611 Thousands/uL      Neutrophils Relative 65 %      Lymphocytes Relative 21 %      Monocytes Relative 10 %      Eosinophils Relative 3 %      Basophils Relative 1 %      Neutrophils Absolute 6 70 Thousands/µL      Lymphocytes Absolute 2 20 Thousands/µL      Monocytes Absolute 1 10 Thousand/µL      Eosinophils Absolute 0 40 Thousand/µL      Basophils Absolute 0 10 Thousands/µL     Procalcitonin [[id number]] Collected:  08/23/20 1629    Lab Status: In process Specimen:  Blood from Arm, Right Updated:  08/23/20 1635    Blood culture #1 [[id number]] Collected:  08/23/20 1629    Lab Status:  No result Specimen:  Blood from Arm, Right     Blood culture #2 [[id number]] Collected:  08/23/20 1629    Lab Status:  No result Specimen:  Blood from Arm, Right                  No orders to display              Procedures  Procedures         ED Course  ED Course as of Aug 23 1844   Sun Aug 23, 2020   1837 Bili chronically elevated, labs o/w neg  MDM  Number of Diagnoses or Management Options  Diagnosis management comments: My exam is suggestive of Parkinson's  Labs r/o residual infection/covid  Disposition  Final diagnoses:   Parkinsonian features   Generalized weakness     Time reflects when diagnosis was documented in both MDM as applicable and the Disposition within this note     Time User Action Codes Description Comment    8/23/2020  6:41 PM Evelyn Diaz [R25 9] Parkinsonian features     8/23/2020  6:42 PM Johnny Diaz [R53 1] Generalized weakness       ED Disposition     ED Disposition Condition Date/Time Comment    Discharge Stable [place] Aug 23, 2020  6:41 PM Rohit Espino discharge to home/self care              Follow-up Information     Follow up With Specialties Details Why Contact Info Additional [address] [facility] Call in 1 day  [address]  [address] [facility], [address] [place], [place], [address] [phone]          Patient's Medications   Discharge Prescriptions    No medications on file     No discharge procedures on file      PDMP Review       Value Time User    PDMP Reviewed  Yes 6/5/2020  2:23 PM Kristy Sesay MD          ED Provider  Electronically Signed by           Brianna Corado MD  08/23/20 6590

## 2020-08-23 NOTE — DISCHARGE INSTRUCTIONS
Your exam suggests Parkinson's-like features  Follow up with a neurologist of your choice  [facility] office info is included above  A covid test was sent; we'll call you if it comes back positive

## 2020-08-24 LAB
ATRIAL RATE: 119 BPM
PROCALCITONIN SERPL-MCNC: <0.05 NG/ML
QRS AXIS: -45 DEGREES
QRSD INTERVAL: 86 MS
QT INTERVAL: 236 MS
QTC INTERVAL: 316 MS
T WAVE AXIS: -52 DEGREES
VENTRICULAR RATE: 108 BPM

## 2020-08-24 PROCEDURE — 93010 ELECTROCARDIOGRAM REPORT: CPT | Performed by: INTERNAL MEDICINE

## 2020-08-25 LAB — SARS-COV-2 RNA SPEC QL NAA+PROBE: NOT DETECTED

## 2020-08-29 LAB
BACTERIA BLD CULT: NORMAL
BACTERIA BLD CULT: NORMAL

## (undated) DEVICE — CURITY NON-ADHERENT STRIPS: Brand: CURITY

## (undated) DEVICE — SUT VICRYL 2-0 CP-1 27 IN J266H

## (undated) DEVICE — SUT VICRYL 0 CP-2 R/C 27 IN J870H

## (undated) DEVICE — 1.25MM THREADED GUIDE WIRE 150MM

## (undated) DEVICE — 2.7MM CANNULATED DRILL BIT/QC 160MM

## (undated) DEVICE — ALL PURPOSE SPONGES,NON-WOVEN, 4 PLY: Brand: CURITY

## (undated) DEVICE — GLOVE SRG BIOGEL 7.5

## (undated) DEVICE — PENCIL ROCKER SWITCH CAUTERY HAND CONTROL

## (undated) DEVICE — ACE WRAP 4 IN STERILE

## (undated) DEVICE — SPONGE LAP 18 X 18 IN STRL RFD

## (undated) DEVICE — NEEDLE 18 G X 1 1/2 SAFETY

## (undated) DEVICE — CUFF TOURNIQUET 30 X 4 IN QUICK CONNECT DISP 1BLA

## (undated) DEVICE — SPLINT COMFORT 4 X 30

## (undated) DEVICE — SUT ETHILON 3-0 PS-1 18 IN 1663G

## (undated) DEVICE — TELFA NON-ADHERENT ABSORBENT DRESSING: Brand: TELFA

## (undated) DEVICE — GLOVE INDICATOR UNDERGLOVE SZ 7.5 GREEN

## (undated) DEVICE — 10FR FRAZIER SUCTION HANDLE: Brand: CARDINAL HEALTH

## (undated) DEVICE — 1.25MM NON-THREADED GUIDE WIRE 150MM

## (undated) DEVICE — PADDING CAST 6IN COTTON STRL

## (undated) DEVICE — ADHESIVE SKIN HIGH VISCOSITY EXOFIN 1ML

## (undated) DEVICE — CHLORAPREP HI-LITE 26ML ORANGE

## (undated) DEVICE — GARMENT,MEDLINE,DVT,INT,CALF,FOAM,MED: Brand: MEDLINE

## (undated) DEVICE — PADDING CAST 4 IN  COTTON STRL

## (undated) DEVICE — DRAPE STERI 1010 18IN X 12IN

## (undated) DEVICE — GLOVE SRG BIOGEL 8

## (undated) DEVICE — INTENDED FOR TISSUE SEPARATION, AND OTHER PROCEDURES THAT REQUIRE A SHARP SURGICAL BLADE TO PUNCTURE OR CUT.: Brand: BARD-PARKER ® CARBON RIB-BACK BLADES

## (undated) DEVICE — BETHLEHEM UNIVERSAL  MIONR EXT: Brand: CARDINAL HEALTH

## (undated) DEVICE — 2.5MM DRILL BIT/QC/GOLD/110MM

## (undated) DEVICE — 2.0MM DRILL BIT WITH DEPTH MARK/QC/140MM

## (undated) DEVICE — GAUZE SPONGES,16 PLY: Brand: CURITY

## (undated) DEVICE — POOLE SUCTION HANDLE W/TUBING: Brand: CARDINAL HEALTH

## (undated) DEVICE — CAST PADDING 4 IN SYNTHETIC STRL

## (undated) DEVICE — ACE WRAP 6 IN STERILE

## (undated) DEVICE — POV-IOD SOLUTION 4OZ BT

## (undated) DEVICE — DRAPE C-ARM X-RAY